# Patient Record
Sex: FEMALE | Race: WHITE | NOT HISPANIC OR LATINO | URBAN - METROPOLITAN AREA
[De-identification: names, ages, dates, MRNs, and addresses within clinical notes are randomized per-mention and may not be internally consistent; named-entity substitution may affect disease eponyms.]

---

## 2019-04-07 ENCOUNTER — INPATIENT (INPATIENT)
Facility: HOSPITAL | Age: 84
LOS: 3 days | Discharge: HOME | DRG: 61 | End: 2019-04-11
Attending: STUDENT IN AN ORGANIZED HEALTH CARE EDUCATION/TRAINING PROGRAM | Admitting: STUDENT IN AN ORGANIZED HEALTH CARE EDUCATION/TRAINING PROGRAM
Payer: MEDICARE

## 2019-04-07 VITALS
SYSTOLIC BLOOD PRESSURE: 155 MMHG | HEART RATE: 76 BPM | HEIGHT: 60 IN | DIASTOLIC BLOOD PRESSURE: 91 MMHG | RESPIRATION RATE: 18 BRPM | OXYGEN SATURATION: 95 % | WEIGHT: 160.06 LBS

## 2019-04-07 DIAGNOSIS — Z90.12 ACQUIRED ABSENCE OF LEFT BREAST AND NIPPLE: Chronic | ICD-10-CM

## 2019-04-07 DIAGNOSIS — I63.9 CEREBRAL INFARCTION, UNSPECIFIED: ICD-10-CM

## 2019-04-07 LAB
ALBUMIN SERPL ELPH-MCNC: 4.2 G/DL — SIGNIFICANT CHANGE UP (ref 3.3–5)
ALP SERPL-CCNC: 64 U/L — SIGNIFICANT CHANGE UP (ref 40–120)
ALT FLD-CCNC: 16 U/L — SIGNIFICANT CHANGE UP (ref 10–45)
ANION GAP SERPL CALC-SCNC: 10 MMOL/L — SIGNIFICANT CHANGE UP (ref 5–17)
APTT BLD: 30.7 SEC — SIGNIFICANT CHANGE UP (ref 27.5–36.3)
AST SERPL-CCNC: 19 U/L — SIGNIFICANT CHANGE UP (ref 10–40)
BASOPHILS # BLD AUTO: 0 K/UL — SIGNIFICANT CHANGE UP (ref 0–0.2)
BASOPHILS NFR BLD AUTO: 0.7 % — SIGNIFICANT CHANGE UP (ref 0–2)
BILIRUB SERPL-MCNC: 0.6 MG/DL — SIGNIFICANT CHANGE UP (ref 0.2–1.2)
BUN SERPL-MCNC: 9 MG/DL — SIGNIFICANT CHANGE UP (ref 7–23)
CALCIUM SERPL-MCNC: 9.8 MG/DL — SIGNIFICANT CHANGE UP (ref 8.4–10.5)
CHLORIDE SERPL-SCNC: 105 MMOL/L — SIGNIFICANT CHANGE UP (ref 96–108)
CO2 SERPL-SCNC: 26 MMOL/L — SIGNIFICANT CHANGE UP (ref 22–31)
CREAT SERPL-MCNC: 1.01 MG/DL — SIGNIFICANT CHANGE UP (ref 0.5–1.3)
EOSINOPHIL # BLD AUTO: 0.2 K/UL — SIGNIFICANT CHANGE UP (ref 0–0.5)
EOSINOPHIL NFR BLD AUTO: 3 % — SIGNIFICANT CHANGE UP (ref 0–6)
GLUCOSE SERPL-MCNC: 189 MG/DL — HIGH (ref 70–99)
HCT VFR BLD CALC: 47 % — HIGH (ref 34.5–45)
HGB BLD-MCNC: 15.1 G/DL — SIGNIFICANT CHANGE UP (ref 11.5–15.5)
INR BLD: 1.08 RATIO — SIGNIFICANT CHANGE UP (ref 0.88–1.16)
LYMPHOCYTES # BLD AUTO: 1.7 K/UL — SIGNIFICANT CHANGE UP (ref 1–3.3)
LYMPHOCYTES # BLD AUTO: 30.9 % — SIGNIFICANT CHANGE UP (ref 13–44)
MCHC RBC-ENTMCNC: 28.3 PG — SIGNIFICANT CHANGE UP (ref 27–34)
MCHC RBC-ENTMCNC: 32.1 GM/DL — SIGNIFICANT CHANGE UP (ref 32–36)
MCV RBC AUTO: 88.1 FL — SIGNIFICANT CHANGE UP (ref 80–100)
MONOCYTES # BLD AUTO: 0.3 K/UL — SIGNIFICANT CHANGE UP (ref 0–0.9)
MONOCYTES NFR BLD AUTO: 5.5 % — SIGNIFICANT CHANGE UP (ref 2–14)
NEUTROPHILS # BLD AUTO: 3.3 K/UL — SIGNIFICANT CHANGE UP (ref 1.8–7.4)
NEUTROPHILS NFR BLD AUTO: 59.9 % — SIGNIFICANT CHANGE UP (ref 43–77)
PLATELET # BLD AUTO: 155 K/UL — SIGNIFICANT CHANGE UP (ref 150–400)
POTASSIUM SERPL-MCNC: 3.8 MMOL/L — SIGNIFICANT CHANGE UP (ref 3.5–5.3)
POTASSIUM SERPL-SCNC: 3.8 MMOL/L — SIGNIFICANT CHANGE UP (ref 3.5–5.3)
PROT SERPL-MCNC: 6.5 G/DL — SIGNIFICANT CHANGE UP (ref 6–8.3)
PROTHROM AB SERPL-ACNC: 12.3 SEC — SIGNIFICANT CHANGE UP (ref 10–12.9)
RBC # BLD: 5.33 M/UL — HIGH (ref 3.8–5.2)
RBC # FLD: 13.1 % — SIGNIFICANT CHANGE UP (ref 10.3–14.5)
SODIUM SERPL-SCNC: 141 MMOL/L — SIGNIFICANT CHANGE UP (ref 135–145)
WBC # BLD: 5.6 K/UL — SIGNIFICANT CHANGE UP (ref 3.8–10.5)
WBC # FLD AUTO: 5.6 K/UL — SIGNIFICANT CHANGE UP (ref 3.8–10.5)

## 2019-04-07 PROCEDURE — 70498 CT ANGIOGRAPHY NECK: CPT | Mod: 26

## 2019-04-07 PROCEDURE — 99291 CRITICAL CARE FIRST HOUR: CPT

## 2019-04-07 PROCEDURE — 99285 EMERGENCY DEPT VISIT HI MDM: CPT | Mod: 25

## 2019-04-07 PROCEDURE — 93010 ELECTROCARDIOGRAM REPORT: CPT

## 2019-04-07 PROCEDURE — 70496 CT ANGIOGRAPHY HEAD: CPT | Mod: 26

## 2019-04-07 RX ORDER — ALTEPLASE 100 MG
5.9 KIT INTRAVENOUS ONCE
Qty: 0 | Refills: 0 | Status: COMPLETED | OUTPATIENT
Start: 2019-04-07 | End: 2019-04-07

## 2019-04-07 RX ORDER — ALTEPLASE 100 MG
53.1 KIT INTRAVENOUS ONCE
Qty: 0 | Refills: 0 | Status: COMPLETED | OUTPATIENT
Start: 2019-04-07 | End: 2019-04-07

## 2019-04-07 RX ORDER — ATORVASTATIN CALCIUM 80 MG/1
80 TABLET, FILM COATED ORAL AT BEDTIME
Qty: 0 | Refills: 0 | Status: DISCONTINUED | OUTPATIENT
Start: 2019-04-07 | End: 2019-04-11

## 2019-04-07 RX ORDER — LABETALOL HCL 100 MG
10 TABLET ORAL EVERY 4 HOURS
Qty: 0 | Refills: 0 | Status: DISCONTINUED | OUTPATIENT
Start: 2019-04-07 | End: 2019-04-11

## 2019-04-07 RX ORDER — ASPIRIN/CALCIUM CARB/MAGNESIUM 324 MG
300 TABLET ORAL DAILY
Qty: 0 | Refills: 0 | Status: DISCONTINUED | OUTPATIENT
Start: 2019-04-07 | End: 2019-04-07

## 2019-04-07 RX ORDER — PANTOPRAZOLE SODIUM 20 MG/1
40 TABLET, DELAYED RELEASE ORAL
Qty: 0 | Refills: 0 | Status: DISCONTINUED | OUTPATIENT
Start: 2019-04-07 | End: 2019-04-11

## 2019-04-07 RX ORDER — ACETAMINOPHEN 500 MG
1000 TABLET ORAL ONCE
Qty: 0 | Refills: 0 | Status: DISCONTINUED | OUTPATIENT
Start: 2019-04-07 | End: 2019-04-11

## 2019-04-07 RX ORDER — ESCITALOPRAM OXALATE 10 MG/1
5 TABLET, FILM COATED ORAL DAILY
Qty: 0 | Refills: 0 | Status: DISCONTINUED | OUTPATIENT
Start: 2019-04-07 | End: 2019-04-11

## 2019-04-07 RX ADMIN — ALTEPLASE 53.1 MILLIGRAM(S): KIT at 12:03

## 2019-04-07 RX ADMIN — ALTEPLASE 354 MILLIGRAM(S): KIT at 12:01

## 2019-04-07 RX ADMIN — Medication 10 MILLIGRAM(S): at 16:20

## 2019-04-07 NOTE — ED PROVIDER NOTE - OBJECTIVE STATEMENT
Patient is 88yF with PMH htn, distant breast ca s/p mastectomy, on asa presenting with RUE paresthesias and weakness, this morning at 9:30 AM was feeling in usual state of health and then when brushing her teeth had paresthesias and weakness in RUE, this improved, then on way to hospital had another episode. No pain or vision changes. No trauma.   Lithuanian speaking with daughter translating easily at bedside, declines phone translation    PMD: Abrudescu  ROS: Denies fever, palpitations, chills, recent sickness, HA, vision changes, cough, SOB, chest pain, abdominal pain, n/v/d/c, dysuria, hematuria, rash, new joint aches, sick contacts, and recent travel.

## 2019-04-07 NOTE — H&P ADULT - HISTORY OF PRESENT ILLNESS
89yo RH Portuguese woman with a PMHx of HTN, Breast CA. s/p L. mastectomy, presents with RUE/RLE paresthesia and weakness. Patient reports she woke normal. States that at 930am, he suddenly began experiencing unsteady gait, which she attributes to weakness of her RLE, as well as paresthesias of her RUE and RLE. Patient states that there was mild improvement of the symptoms without resolution. EMS was activated.   At [facility], Code Stroke was activated. CTH showed no indication of acute infarct. Labs grossly WNL. VSS. Patient remained symptomatic with hemiparesis and hemisensory loss.   Patient currently denies fevers, chills, ns, cp, sob, abd pain, n/v/d/c.     Benefits and risks of tPA were discussed with the patient, translated through HHA, as well as with daughter, fluent in english, over the phone.   All contraindications were reviewed with patient and patients daughter separately.   Both patient and patients daughter were agreeable to tPA administration.   tPA administered at 12h01.     NIHSS 2; MRS 1.

## 2019-04-07 NOTE — ED ADULT NURSE REASSESSMENT NOTE - NS ED NURSE REASSESS COMMENT FT1
Fiona LEARY Neuro resident said that we will not regulate the pt's BP and give any additional medications until the diastolic BP is above 220.   Maximiliano JOHNSON was present for the conversation and agreed with the treatment plan.   RNs will continue the neuro checks and vitals assessment as per stroke parameters.

## 2019-04-07 NOTE — H&P ADULT - ATTENDING COMMENTS
I have seen and examined this patient with the stroke neurology team.     History was reviewed with the patient and/or available family members.   ROS: All negative except documented in the HPI.   Neurological exam was performed and agree with exam as documented above.   Laboratory results and imaging studies were reviewed by me.   I agree with the neurology resident note as documented above.    88 years old woman with vascular risk factors of age and HTN is evaluated at [facility] for right-sided weakness. She is reported to be last known well at around 9:30 AM when she was brushing her teeth. Soon after that she noted right-sided weakness and sensory paresthesia, prompting her presentation to [facility]. Neurological examination showed mild right hemiparesis and difficulty with the gait. CT brain on admission did not show any evidence of acute infarct or hemorrhage. CTA head and neck to my eye showed severe to critical left supraclinoid ICA stenosis and moderate to severe proximal left ICA stenosis.    Impression:  Cerebral embolism with cerebral infarction. Probable left MCA distribution stroke involving subcortical structure like corona radiata - likely etiology being large vessel disease i.e. symptomatic intracranial atherosclerosis leading to artery to artery embolism versus focal cerebral hypoperfusion versus small vessel disease and less likely to be due to embolic stroke from a proximal source like cardiac/paradoxical source of embolism    Plan:   - Risks, benefits (considering potentially disabling deficits) and adverse reactions associated with IV tPA including hemorrhagic stroke were discussed with patient and available family member including daughter over the phone in detail. After ruling out all the contraindications and obtaining verbal consent, patient was treated with IV tPA  - Continue with  24 hours post IV tPA precautions including BP goal<185/110 mmHg before the tPA bolus administration and <180/105 mmHg after tPA bolus for first 24 hours followed by gradual normotension as per protocol  - Not a candidate for neurosurgical intervention based on CTA head and neck findings and low NIHSS on admission  - MRI brain without contrast   - Aspirin and pharmacological DVT prophylaxis to be considered at 24 hours after the IV tPA based on repeating brain imaging, SCDs for DVT prophylaxis in the interim  - Atorvastatin 80 mg at bedtime after establishing enteral access or passing swallow evaluation; further dose titration as per LDL results  - HbA1C and LDL, continue with aggressive vascular risk factors modifications   - TTE with bubble study and continue with telemetry to screen for possible cardiac source of embolism  - Prolonged cardiac monitoring with 30 days event monitor versus ICM to screen for occult cardiac arrhythmias like atrial fibrillation being the cardiac source of embolism to be considered based on results of above mentioned cardiac tests  - PT/OT/Speech and swallow evaluation   - Stroke education    Above mentioned plan was discussed with patient and available family member in detail. All the questions were answered and concerns were addressed. More than 82 minutes were spent in evaluation and management of this critically ill and unstable patient with an acute stroke.

## 2019-04-07 NOTE — ED PROVIDER NOTE - PHYSICAL EXAMINATION
Gen: NAD, AOx3  Head: NCAT  HEENT: PERRL, oral mucosa moist, normal conjunctiva  Lung: CTAB, no respiratory distress  CV: rrr, no murmurs, Normal perfusion  Abd: soft, NTND, no CVA tenderness  MSK: No edema, no visible deformities  Neuro: No focal neurologic deficits, CN intact, motor RUE 5/5 LUE 5/5 RLE 4+/5 LLE 5/5 and sensation intact, no dysmetria, no pronator drift   Skin: No rash   Psych: normal affect

## 2019-04-07 NOTE — ED PROVIDER NOTE - CLINICAL SUMMARY MEDICAL DECISION MAKING FREE TEXT BOX
Possible TIA vs stroke, code stroke called on arrival with neurology shortly at bedside. Also possible is peripheral neuropathy.

## 2019-04-07 NOTE — H&P ADULT - NSHPLABSRESULTS_GEN_ALL_CORE
Vitals:  T(C): 37 (04-07-19 @ 20:00), Max: 37 (04-07-19 @ 20:00)  HR: 65 (04-07-19 @ 22:00) (63 - 76)  BP: 157/75 (04-07-19 @ 22:00) (155/91 - 206/90)  RR: 19 (04-07-19 @ 22:00) (15 - 23)  SpO2: 95% (04-07-19 @ 22:00) (95% - 99%)    Labs:                        15.1   5.6   )-----------( 155      ( 07 Apr 2019 11:29 )             47.0     04-07    141  |  105  |  9   ----------------------------<  189<H>  3.8   |  26  |  1.01    Ca    9.8      07 Apr 2019 11:29    TPro  6.5  /  Alb  4.2  /  TBili  0.6  /  DBili  x   /  AST  19  /  ALT  16  /  AlkPhos  64  04-07    CAPILLARY BLOOD GLUCOSE  161 (07 Apr 2019 11:29)      POCT Blood Glucose.: 167 mg/dL (07 Apr 2019 11:19)    LIVER FUNCTIONS - ( 07 Apr 2019 11:29 )  Alb: 4.2 g/dL / Pro: 6.5 g/dL / ALK PHOS: 64 U/L / ALT: 16 U/L / AST: 19 U/L / GGT: x             PT/INR - ( 07 Apr 2019 11:29 )   PT: 12.3 sec;   INR: 1.08 ratio         PTT - ( 07 Apr 2019 11:29 )  PTT:30.7 sec    Radiology:  < from: CT Angio Neck w/ IV Cont (04.07.19 @ 12:10) >    IMPRESSION:    CT BRAIN:  No acute intracranial hemorrhage, mass effect, vasogenic edema, or evidence of acute territorial infarct.  Chronic left foraminal infarct and moderate white matter microvascular ischemic disease.  Ventricles moderately dilated slightly out of proportion to the sulci, suggesting the presence of normal pressure hydrocephalus    CTA BRAIN:  No flow-limiting stenosis.  Mild narrowing of the distal right supraclinoid left ICA. Mild narrowing of the P2 segment of the right PCA. Mild narrowing of the distal right M1 segment.  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   The neck measures approximately 1 mm in size.    CTA NECK:  55-60% short segment stenosis by NASCET criteria of the proximal left ICA beginning 1 cm from its origin due to calcified plaque.   Normal flow-related enhancement of the more distal left ICA.    SURJIT ARTIS M.D., ATTENDING RADIOLOGIST  This document has been electronically signed. Apr7 2019 12:17PM  < end of copied text >

## 2019-04-07 NOTE — CONSULT NOTE ADULT - SUBJECTIVE AND OBJECTIVE BOX
Patient is 88yF with PMH htn, distant breast ca s/p mastectomy, on asa presenting with RUE paresthesias and weakness, this morning at 9:30 AM was feeling in usual state of health and then when brushing her teeth had paresthesias and weakness in RUE, this improved, then on way to hospital had another episode. No pain or vision changes. No trauma.     PAST MEDICAL & SURGICAL HISTORY:  Dementia  Breast cancer  HTN (hypertension)  H/O mastectomy, left      Review of Systems:   CONSTITUTIONAL: No fever, weight loss, or fatigue  EYES: No eye pain, visual disturbances, or discharge  ENMT:  No difficulty hearing, tinnitus, vertigo; No sinus or throat pain  NECK: No pain or stiffness  BREASTS: No pain, masses, or nipple discharge  RESPIRATORY: No cough, wheezing, chills or hemoptysis; No shortness of breath  CARDIOVASCULAR: No chest pain, palpitations, dizziness, or leg swelling  GASTROINTESTINAL: No abdominal or epigastric pain. No nausea, vomiting, or hematemesis; No diarrhea or constipation. No melena or hematochezia.  GENITOURINARY: No dysuria, frequency, hematuria, or incontinence  NEUROLOGICAL: No headaches, memory loss, weakness and paresthesias R arm and R leg, numbness, or tremors  SKIN: No itching, burning, rashes, or lesions   LYMPH NODES: No enlarged glands  ENDOCRINE: No heat or cold intolerance; No hair loss  MUSCULOSKELETAL: No joint pain or swelling; No muscle, back, or extremity pain  PSYCHIATRIC: No depression, anxiety, mood swings, or difficulty sleeping  HEME/LYMPH: No easy bruising, or bleeding gums  ALLERY AND IMMUNOLOGIC: No hives or eczema    Allergies    penicillins (Unknown)    Intolerances        Social History:     FAMILY HISTORY:      MEDICATIONS  (STANDING):  atorvastatin 80 milliGRAM(s) Oral at bedtime    MEDICATIONS  (PRN):        CAPILLARY BLOOD GLUCOSE  161 (07 Apr 2019 11:29)      POCT Blood Glucose.: 167 mg/dL (07 Apr 2019 11:19)    I&O's Summary      PHYSICAL EXAM:  GENERAL: NAD, well-developed  HEAD:  Atraumatic, Normocephalic  EYES: EOMI, PERRLA, conjunctiva and sclera clear  NECK: Supple, No JVD  CHEST/LUNG: Clear to auscultation bilaterally; No wheeze  HEART: Regular rate and rhythm; No murmurs, rubs, or gallops  ABDOMEN: Soft, Nontender, Nondistended; Bowel sounds present  EXTREMITIES:  2+ Peripheral Pulses, No clubbing, cyanosis, or edema  PSYCH: AAOx3  NEUROLOGY: R sided weakness, L facial weakness  SKIN: No rashes or lesions    LABS:                        15.1   5.6   )-----------( 155      ( 07 Apr 2019 11:29 )             47.0     04-07    141  |  105  |  9   ----------------------------<  189<H>  3.8   |  26  |  1.01    Ca    9.8      07 Apr 2019 11:29    TPro  6.5  /  Alb  4.2  /  TBili  0.6  /  DBili  x   /  AST  19  /  ALT  16  /  AlkPhos  64  04-07    PT/INR - ( 07 Apr 2019 11:29 )   PT: 12.3 sec;   INR: 1.08 ratio         PTT - ( 07 Apr 2019 11:29 )  PTT:30.7 sec          RADIOLOGY & ADDITIONAL TESTS:    Imaging Personally Reviewed:  < from: CT Brain Stroke Protocol (04.07.19 @ 12:10) >  IMPRESSION:    CT BRAIN:    No acute intracranial hemorrhage, mass effect, vasogenic edema, or   evidence of acute territorial infarct.    Chronic left foraminal infarct and moderate white matter microvascular   ischemic disease.    Ventricles moderately dilated slightly out of proportion to the sulci,   suggesting the presence of normal pressure hydrocephalus    CTA BRAIN:    No flow-limiting stenosis.    Mild narrowing of the distal right supraclinoid left ICA. Mild narrowing   of the P2 segment of the right PCA. Mild narrowing of the distal right M1   segment.    1 to 2 mm posterolaterally projecting left proximal supraclinoid   aneurysm. The neck measures approximately 1 mm in size.    CTA NECK:    55-60% short segment stenosis by NASCET criteria of the proximal left ICA   beginning 1 cm from its origin due to calcified plaque. Normal   flow-related enhancement of the more distal left ICA.    < end of copied text >    Consultant(s) Notes Reviewed:      Care Discussed with Consultants/Other Providers:

## 2019-04-07 NOTE — ED ADULT NURSE REASSESSMENT NOTE - NS ED NURSE REASSESS COMMENT FT1
It has been nearly 2 and 1/2 hours since the pt started getting TPA and the pt is still in the ED.   VESTA Etienne made aware of the situation. Pt got a bed on the stroke unit and when Sanju HERNANDEZ called to give report on this pt the nurse said that she wouldn't be down to take report for another 25-30 minutes because the bed is not ready on the unit. Pt is now undergoing Q30 minute vitals in the ED.   VESTA Etienne made aware that DAVID Matos will not be down to the ED for another 25-30 minutes. VESTA is going to speak with the stroke unit.

## 2019-04-07 NOTE — ED ADULT NURSE REASSESSMENT NOTE - NS ED NURSE REASSESS COMMENT FT1
Report given to stroke unit RNGladis at pt's bedside. RN, Gladis monitoring the pt and will preform the next neuro check on the pt.

## 2019-04-07 NOTE — CONSULT NOTE ADULT - ASSESSMENT
88 f with  Possible CVA  - s/p TPA  - MRI brain  - Echo  - Neurology evaluation    HTN  - control    Dementia  - supportive care    d/w daughter at bedside  Mau Mc MD pager [phone]

## 2019-04-07 NOTE — ED ADULT NURSE NOTE - NSIMPLEMENTINTERV_GEN_ALL_ED
Implemented All Fall with Harm Risk Interventions:  [place] to call system. Call bell, personal items and telephone within reach. Instruct patient to call for assistance. Room bathroom lighting operational. Non-slip footwear when patient is off stretcher. Physically safe environment: no spills, clutter or unnecessary equipment. Stretcher in lowest position, wheels locked, appropriate side rails in place. Provide visual cue, wrist band, yellow gown, etc. Monitor gait and stability. Monitor for mental status changes and reorient to person, place, and time. Review medications for side effects contributing to fall risk. Reinforce activity limits and safety measures with patient and family. Provide visual clues: red socks.

## 2019-04-07 NOTE — H&P ADULT - NSHPPHYSICALEXAM_GEN_ALL_CORE
PHYSICAL EXAMINATION:  General: Well-developed, well nourished, in no acute distress.  Eyes: Conjunctiva and sclera clear.  Neurologic:  - Mental Status:  Alert, awake, oriented to person, place, and time; Speech is fluent with intact naming, repetition, and comprehension; Good overall fund of knowledge;   - Cranial Nerves II-XII:    II:  Visual fields are full to confrontation; Pupils are equal, round, and reactive to light;   III, IV, VI:  Extraocular movements are intact without nystagmus.  V:  Facial sensation is intact in the V1-V3 distribution bilaterally.  VII:  Face is symmetric with normal eye closure and smile  VIII:  Hearing is intact to finger rub.  IX, X:  Uvula is midline and soft palate rises symmetrically  XI:  Head turning and shoulder shrug are intact.  XII:  Tongue protudes in the midline.    + Motor:  Drift in RLE only. Strength is 5/5 elsewhere.     - Reflexes:  2+ and symmetric at the biceps, triceps, brachioradialis, knees, and ankles.  Plantar responses flexor.    + Sensory:  Diminished to pinprick in RUE and RLE.     - Coordination:  Finger-nose-finger and heel-knee-shin intact without dysmetria.  Rapid alternating hand movements intact.  - Gait:   Deferred     NIHSS 2.

## 2019-04-07 NOTE — ED ADULT NURSE REASSESSMENT NOTE - NS ED NURSE REASSESS COMMENT FT1
11:50 TPA being mixed, neuro attending wants the pt's TPA dose to be the 144lbs or 65.5kg.   11:55 It was noted that pt has a minor facial droop on the left side at rest. When smiling the droop is not visible. Neruo team indicated that her stroke score would increase to 2.   12:01 TPA bolus administered by Fiona LEARY.   12:03 RNSanju and Zane HERNANDEZ started the pt's TPA infusion.   12:05 Dysphagia completed, pt passed. Documented in the ED Stroke flowsheet.   Will continue to monitor pt on the neuro flow sheet as per our parameters with stroke.

## 2019-04-07 NOTE — H&P ADULT - ASSESSMENT
87yo RH Polish woman with a PMHx of HTN, Breast CA. s/p L. mastectomy, presents with RUE/RLE paresthesia and weakness.   NIHSS 2    Impression:  Mixed R. Hemiparesis and Hemisensory loss 2/2 suspected L. hemispheric dysfunction 2/2 ESUS vs. .    Plan:  Permissive HTN for 24 hours up to 185/105  Repeat CT head in 24 hours  If repeat CT head without hemorrhagic conversion, start on heparin/lovenox for DVT prophylaxis and ASA 81 mg QD  MRI brain without contrast  MRA head without contrast and neck with contrast  TTE with bubble study for possible valvular heart disease  Lipitor 80 mg PO QHS  HbA1c, LDL and continue with aggressive vascular risk factors modifications   Tele monitor  PT/OT/S&S eval

## 2019-04-07 NOTE — ED PROVIDER NOTE - ATTENDING CONTRIBUTION TO CARE
Private Physician Sarbjit  88y female pmh Hypertension,breast ca with left mastecomy, Pt complains of rue weakness and numbness when went to brush teeth. Lasted approx 15min. resolved. ?recurrence in ambulance also resolved. PE WDWN eldelry female awake alert normocephalic atraumatic neck supple chest clear anterior & posterior abd soft +bs no mass guarding, neuro gcs 15 speech fluent power 4/5 rle,5/5lle,5./5 felipe upper extr.  Fuad Hayden MD, Facep

## 2019-04-07 NOTE — ED ADULT NURSE NOTE - OBJECTIVE STATEMENT
89 y/o Female reports to ED from home complaining of right arm weakness. 87 y/o Female reports to ED from home via EMS. A&ox2 (person, place) complaining of right arm weakness. Pt primarily speaks [place] citizen, caregiver that is with pt is translating for her. Pt's neuro status is within her normal range as per caregiver. Pt reports that she woke up this morning feeling fine and then went into the bathroom to brush her teeth when she had sudden onset right arm weakness. Pt reports that the weakness lasted a few minutes and then resolved. Pt came to ED denying any weakness currently. Upon evaluation the pt was found to have a slight weakness to the right lower extremity. Pt reports pain in that extremity with examination. Pt denies changes in vision. Pt denies changes in her ability to ambulate. Pt denies LOC, H/A, SOB, C/P, N/V/D, abd pain. Pt denies fever or chills. Pt's family was called and notified. Pt scored a 1 on stroke scale upon her initial arrival and then again with a 1 at 12:35. Stroke code was called at 11:16 by Jackelyn JOHNSON. Pt and caregiver deny that pt takes blood thinners daily. Caregiver at bedside, pt on the cardiac monitor. Will continue to monitor pt.

## 2019-04-08 LAB
ANION GAP SERPL CALC-SCNC: 15 MMOL/L — SIGNIFICANT CHANGE UP (ref 5–17)
APPEARANCE UR: CLEAR — SIGNIFICANT CHANGE UP
BILIRUB UR-MCNC: NEGATIVE — SIGNIFICANT CHANGE UP
BUN SERPL-MCNC: 8 MG/DL — SIGNIFICANT CHANGE UP (ref 7–23)
CALCIUM SERPL-MCNC: 9.1 MG/DL — SIGNIFICANT CHANGE UP (ref 8.4–10.5)
CHLORIDE SERPL-SCNC: 104 MMOL/L — SIGNIFICANT CHANGE UP (ref 96–108)
CHOLEST SERPL-MCNC: 201 MG/DL — HIGH (ref 10–199)
CO2 SERPL-SCNC: 23 MMOL/L — SIGNIFICANT CHANGE UP (ref 22–31)
COLOR SPEC: SIGNIFICANT CHANGE UP
CREAT SERPL-MCNC: 0.84 MG/DL — SIGNIFICANT CHANGE UP (ref 0.5–1.3)
DIFF PNL FLD: NEGATIVE — SIGNIFICANT CHANGE UP
ESTIMATED AVERAGE GLUCOSE: 123 MG/DL — HIGH (ref 68–114)
GLUCOSE SERPL-MCNC: 115 MG/DL — HIGH (ref 70–99)
GLUCOSE UR QL: NEGATIVE — SIGNIFICANT CHANGE UP
HBA1C BLD-MCNC: 5.9 % — HIGH (ref 4–5.6)
HBA1C BLD-MCNC: 5.9 % — HIGH (ref 4–5.6)
HCT VFR BLD CALC: 41.5 % — SIGNIFICANT CHANGE UP (ref 34.5–45)
HDLC SERPL-MCNC: 38 MG/DL — LOW
HGB BLD-MCNC: 13.8 G/DL — SIGNIFICANT CHANGE UP (ref 11.5–15.5)
KETONES UR-MCNC: NEGATIVE — SIGNIFICANT CHANGE UP
LEUKOCYTE ESTERASE UR-ACNC: NEGATIVE — SIGNIFICANT CHANGE UP
LIPID PNL WITH DIRECT LDL SERPL: 136 MG/DL — HIGH
MCHC RBC-ENTMCNC: 29 PG — SIGNIFICANT CHANGE UP (ref 27–34)
MCHC RBC-ENTMCNC: 33.2 GM/DL — SIGNIFICANT CHANGE UP (ref 32–36)
MCV RBC AUTO: 87.3 FL — SIGNIFICANT CHANGE UP (ref 80–100)
NITRITE UR-MCNC: NEGATIVE — SIGNIFICANT CHANGE UP
PH UR: 7 — SIGNIFICANT CHANGE UP (ref 5–8)
PLATELET # BLD AUTO: 134 K/UL — LOW (ref 150–400)
POTASSIUM SERPL-MCNC: 3.4 MMOL/L — LOW (ref 3.5–5.3)
POTASSIUM SERPL-SCNC: 3.4 MMOL/L — LOW (ref 3.5–5.3)
PROT UR-MCNC: NEGATIVE — SIGNIFICANT CHANGE UP
RBC # BLD: 4.75 M/UL — SIGNIFICANT CHANGE UP (ref 3.8–5.2)
RBC # FLD: 13 % — SIGNIFICANT CHANGE UP (ref 10.3–14.5)
SODIUM SERPL-SCNC: 142 MMOL/L — SIGNIFICANT CHANGE UP (ref 135–145)
SP GR SPEC: 1.01 — SIGNIFICANT CHANGE UP (ref 1.01–1.02)
TOTAL CHOLESTEROL/HDL RATIO MEASUREMENT: 5.3 RATIO — SIGNIFICANT CHANGE UP (ref 3.3–7.1)
TRIGL SERPL-MCNC: 137 MG/DL — SIGNIFICANT CHANGE UP (ref 10–149)
UROBILINOGEN FLD QL: NEGATIVE — SIGNIFICANT CHANGE UP
WBC # BLD: 5.8 K/UL — SIGNIFICANT CHANGE UP (ref 3.8–10.5)
WBC # FLD AUTO: 5.8 K/UL — SIGNIFICANT CHANGE UP (ref 3.8–10.5)

## 2019-04-08 PROCEDURE — 93970 EXTREMITY STUDY: CPT | Mod: 26

## 2019-04-08 PROCEDURE — 70551 MRI BRAIN STEM W/O DYE: CPT | Mod: 26

## 2019-04-08 RX ORDER — ENOXAPARIN SODIUM 100 MG/ML
40 INJECTION SUBCUTANEOUS DAILY
Qty: 0 | Refills: 0 | Status: DISCONTINUED | OUTPATIENT
Start: 2019-04-08 | End: 2019-04-11

## 2019-04-08 RX ORDER — ASPIRIN/CALCIUM CARB/MAGNESIUM 324 MG
81 TABLET ORAL DAILY
Qty: 0 | Refills: 0 | Status: DISCONTINUED | OUTPATIENT
Start: 2019-04-08 | End: 2019-04-11

## 2019-04-08 RX ORDER — SODIUM CHLORIDE 9 MG/ML
250 INJECTION INTRAMUSCULAR; INTRAVENOUS; SUBCUTANEOUS ONCE
Qty: 0 | Refills: 0 | Status: COMPLETED | OUTPATIENT
Start: 2019-04-08 | End: 2019-04-08

## 2019-04-08 RX ORDER — POTASSIUM CHLORIDE 20 MEQ
40 PACKET (EA) ORAL ONCE
Qty: 0 | Refills: 0 | Status: COMPLETED | OUTPATIENT
Start: 2019-04-08 | End: 2019-04-08

## 2019-04-08 RX ADMIN — Medication 40 MILLIEQUIVALENT(S): at 12:32

## 2019-04-08 RX ADMIN — SODIUM CHLORIDE 250 MILLILITER(S): 9 INJECTION INTRAMUSCULAR; INTRAVENOUS; SUBCUTANEOUS at 03:32

## 2019-04-08 RX ADMIN — ESCITALOPRAM OXALATE 5 MILLIGRAM(S): 10 TABLET, FILM COATED ORAL at 12:32

## 2019-04-08 RX ADMIN — ENOXAPARIN SODIUM 40 MILLIGRAM(S): 100 INJECTION SUBCUTANEOUS at 18:49

## 2019-04-08 RX ADMIN — PANTOPRAZOLE SODIUM 40 MILLIGRAM(S): 20 TABLET, DELAYED RELEASE ORAL at 09:31

## 2019-04-08 RX ADMIN — ATORVASTATIN CALCIUM 80 MILLIGRAM(S): 80 TABLET, FILM COATED ORAL at 23:55

## 2019-04-08 RX ADMIN — Medication 81 MILLIGRAM(S): at 18:50

## 2019-04-08 NOTE — OCCUPATIONAL THERAPY INITIAL EVALUATION ADULT - ADL RETRAINING, OT EVAL
Patient will dress lower body with Min A, AE as needed within 2 weeks. Patient will dress lower body independently, AE as needed within 2 weeks. Patient will dress upper body independently, AE as needed within 2 weeks.

## 2019-04-08 NOTE — OCCUPATIONAL THERAPY INITIAL EVALUATION ADULT - LIVES WITH, PROFILE
alone/As per conversation with pt's daughter, pt lives in private home, 0 steps to enter, 0 steps inside, tub with curtain and grab bars, [facility] 24/7 to assist with ADLs, pt was ambulating with rolling walker.

## 2019-04-08 NOTE — PROGRESS NOTE ADULT - SUBJECTIVE AND OBJECTIVE BOX
Patient is a 88y old  Female who presents with a chief complaint of R sided weakness (2019 16:00)      SUBJECTIVE / OVERNIGHT EVENTS: awake, comfortable. Wants to go home.  Review of Systems  chest pain no  palpitations no  sob no  nausea no  headache no    MEDICATIONS  (STANDING):  aspirin  chewable 81 milliGRAM(s) Oral daily  atorvastatin 80 milliGRAM(s) Oral at bedtime  enoxaparin Injectable 40 milliGRAM(s) SubCutaneous daily  escitalopram 5 milliGRAM(s) Oral daily  pantoprazole    Tablet 40 milliGRAM(s) Oral before breakfast    MEDICATIONS  (PRN):  acetaminophen  IVPB .. 1000 milliGRAM(s) IV Intermittent once PRN Mild Pain (1 - 3), Moderate Pain (4 - 6)  labetalol Injectable 10 milliGRAM(s) IV Push every 4 hours PRN SBP >180      Vital Signs Last 24 Hrs  T(C): 37 (2019 08:14), Max: 37 (2019 20:00)  T(F): 98.6 (2019 08:14), Max: 98.6 (2019 20:00)  HR: 69 (2019 18:00) (58 - 79)  BP: 165/87 (2019 18:00) (124/53 - 182/87)  BP(mean): 108 (2019 18:00) (74 - 113)  RR: 18 (2019 18:00) (11 - 24)  SpO2: 96% (2019 18:00) (94% - 99%)    PHYSICAL EXAM:  GENERAL: NAD, well-developed  HEAD:  Atraumatic, Normocephalic  EYES: EOMI, PERRLA, conjunctiva and sclera clear  NECK: Supple, No JVD  CHEST/LUNG: Clear to auscultation bilaterally; No wheeze  HEART: Regular rate and rhythm; No murmurs, rubs, or gallops  ABDOMEN: Soft, Nontender, Nondistended; Bowel sounds present  EXTREMITIES:  2+ Peripheral Pulses, No clubbing, cyanosis, or edema  PSYCH: pleasantly confused.  NEUROLOGY: non-focal  SKIN: No rashes or lesions    LABS:                        13.8   5.8   )-----------( 134      ( 2019 05:42 )             41.5     04-08    142  |  104  |  8   ----------------------------<  115<H>  3.4<L>   |  23  |  0.84    Ca    9.1      2019 05:42    TPro  6.5  /  Alb  4.2  /  TBili  0.6  /  DBili  x   /  AST  19  /  ALT  16  /  AlkPhos  64  04-07    PT/INR - ( 2019 11:29 )   PT: 12.3 sec;   INR: 1.08 ratio         PTT - ( 2019 11:29 )  PTT:30.7 sec      Urinalysis Basic - ( 2019 12:55 )    Color: Light Yellow / Appearance: Clear / S.011 / pH: x  Gluc: x / Ketone: Negative  / Bili: Negative / Urobili: Negative   Blood: x / Protein: Negative / Nitrite: Negative   Leuk Esterase: Negative / RBC: x / WBC x   Sq Epi: x / Non Sq Epi: x / Bacteria: x          RADIOLOGY & ADDITIONAL TESTS:    Imaging Personally Reviewed:  < from: MR Head No Cont (19 @ 15:45) >    IMPRESSION: Large ventricles consistent with moderate atrophy. Small   vessel white matter ischemic changes. A few tiny foci of diffusion   restriction in the left posterior frontal white matter consistent with   acute infarcts.      < end of copied text >    Consultant(s) Notes Reviewed:      Care Discussed with Consultants/Other Providers:

## 2019-04-08 NOTE — PROVIDER CONTACT NOTE (OTHER) - ASSESSMENT
patient was sleeping and was awaken for neurologically assessment. No change in mental status. Patient was resting comfortably. BP: 133/88, HR 58, SPO2: 95 on room air.

## 2019-04-08 NOTE — OCCUPATIONAL THERAPY INITIAL EVALUATION ADULT - DIAGNOSIS, OT EVAL
Pt p/w deficits in ADL status and functional mobility due to impaired cognition, balance, and coordination.

## 2019-04-08 NOTE — PROGRESS NOTE ADULT - ASSESSMENT
88 f with  CVA  - s/p TPA  - Echo    HTN  - control    Dementia  - supportive care  Mau Mc MD pager [phone]

## 2019-04-08 NOTE — OCCUPATIONAL THERAPY INITIAL EVALUATION ADULT - PLANNED THERAPY INTERVENTIONS, OT EVAL
ADL retraining/balance training/bed mobility training/transfer training/cognitive, visual perceptual

## 2019-04-08 NOTE — PROVIDER CONTACT NOTE (OTHER) - ASSESSMENT
patient is neurologically stable, BP: 124/53, HR 59, SPO2: 95 on room air. Patient was sleeping prior.

## 2019-04-08 NOTE — OCCUPATIONAL THERAPY INITIAL EVALUATION ADULT - PERTINENT HX OF CURRENT PROBLEM, REHAB EVAL
87yo RH Thai woman with a PMHx of HTN, Breast CA. s/p L. mastectomy, presents with RUE/RLE paresthesia and weakness. He suddenly began experiencing unsteady gait, which she attributes to weakness and paresthesias of her RUE and RLE. At [facility], Code Stroke was activated. CTH showed no indication of acute infarct. Labs grossly WNL. VSS. Patient remained symptomatic with hemiparesis and hemisensory loss. tPA administered at 12h01.

## 2019-04-08 NOTE — PHYSICAL THERAPY INITIAL EVALUATION ADULT - DISCHARGE DISPOSITION, PT EVAL
home w/ home PT/home w/ assist/home/Home with Home PT for strengthening, bed mob, transfer, gait and balance training, home with assist for all functional mobility

## 2019-04-08 NOTE — PHYSICAL THERAPY INITIAL EVALUATION ADULT - ADDITIONAL COMMENTS
as per pt and daughter via phone, pt resides in a  with 24/7 aide, no stair to enter, PTA, pt able to amb (I) with RW indoor, distance limited d/t decr endurance, required assist for all functional mobility

## 2019-04-08 NOTE — PHYSICAL THERAPY INITIAL EVALUATION ADULT - PERTINENT HX OF CURRENT PROBLEM, REHAB EVAL
88yoF with HTN, breast Ca s/p L mastectomy, p/w R paresthesia, now s/p tPA, CT head 4/7, chronic L foraminal infarct, NPH< mild narrowing of L ICA, R PCA, ECG 4/7, incomplete R BBB, Left ventricular hypertrophy

## 2019-04-08 NOTE — OCCUPATIONAL THERAPY INITIAL EVALUATION ADULT - ADDITIONAL COMMENTS
CT BRAIN 4/7- No acute intracranial hemorrhage, mass effect, vasogenic edema, or evidence of acute territorial infarct. Chronic left foraminal infarct and moderate white matter microvascular ischemic disease. Ventricles moderately dilated slightly out of proportion to the sulci, suggesting the presence of normal pressure hydrocephalus    CTA BRAIN 4/7- No flow-limiting stenosis. Mild narrowing of the distal right supraclinoid left ICA. Mild narrowing of the P2 segment of the right PCA. Mild narrowing of the distal right M1 segment. 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 The neck measures approximately 1 mm in size.    CTA NECK 4/7- 55-60% short segment stenosis by NASCET criteria of the proximal left ICA beginning 1 cm from its origin due to calcified plaque. Normal flow-related enhancement of the more distal left ICA.

## 2019-04-08 NOTE — OCCUPATIONAL THERAPY INITIAL EVALUATION ADULT - ORIENTATION, REHAB EVAL
Unable to provide date of birth, required vc's for place, time, and situation./place/time/situation/person

## 2019-04-08 NOTE — PROGRESS NOTE ADULT - ASSESSMENT
ASSESSMENT: 88 year old woman with vascular risk factors of age and HTN is evaluated at [facility] for right-sided weakness. She was reported to be last known well at around 9:30 AM 4/7/19 when she was brushing her teeth. Soon after that she noted right-sided weakness and sensory paresthesia, prompting her presentation to [facility]. CT brain on admission did not show any evidence of acute infarct or hemorrhage. CTA head and neck to my eye showed severe to critical left supraclinoid ICA stenosis and moderate to severe proximal left ICA stenosis.    Impression:  Cerebral embolism with cerebral infarction. Probable left MCA distribution stroke involving subcortical structure like corona radiata - likely etiology being large vessel disease i.e. symptomatic intracranial atherosclerosis leading to artery to artery embolism versus focal cerebral hypoperfusion versus small vessel disease and less likely to be due to embolic stroke from a proximal source like cardiac/paradoxical source of embolism.     NEURO: neurologically without acute change, continue close monitoring for neurologic deterioration, permissive HTN, titrate statin to LDL goal less than 70, MR imaging pending. Physical therapy/OT/Speech eval.    ANTITHROMBOTIC THERAPY: ASA pending stable repeat 24 hour imaging     PULMONARY: protecting airway, saturating well     CARDIOVASCULAR: check TTE, cardiac monitoring, Prolonged cardiac monitoring with 30 days event monitor versus ICM to screen for occult cardiac arrhythmias like atrial fibrillation being the cardiac source of embolism to be considered based on results of above mentioned cardiac tests          SBP goal:  <180/105 mmHg after tPA bolus for first 24 hours followed by gradual normotension as per protocol    GASTROINTESTINAL:  dysphagia screen passed, tolerating diet, advance to regular      Diet: puree     RENAL: BUN/Cr within range, ensure adequate hydration, good urine output      Na Goal: Greater than 135     Serna: n     HEMATOLOGY: H/H without anemia, Platelets 134, monitor for further thrombocytopenia     DVT ppx: venodynes for now, pending repeat 24 hour imaging consider pharmacological ppx     ID: afebrile, no leukocytosis, + frequency for urination, check UA    DISPOSITION: Rehab or home depending on PT eval once stable and workup is complete      CORE MEASURES:        Admission NIHSS:2      TPA: [x] YES [] NO      LDL/HDL: 136/38     Depression Screen:  NA- unable to participate      Statin Therapy: y      Dysphagia Screen: [x] PASS [] FAIL     Smoking [] YES [x] NO      Afib [] YES [x] NO     Stroke Education [x] YES [] NO    Obtain screening lower extremity venous ultrasound in patients who meet 1 or more of the following criteria as patient is high risk for DVT/PE on admission:   [] History of DVT/PE  []Hypercoagulable states (Factor V Leiden, Cancer, OCP, etc. ) -history of breast ca   []Prolonged immobility (hemiplegia/hemiparesis/post operative or any other extended immobilization)  [] Transferred from outside facility (Rehab or Long term care)  [] Age </= to 50

## 2019-04-08 NOTE — OCCUPATIONAL THERAPY INITIAL EVALUATION ADULT - BALANCE TRAINING, PT EVAL
Pt will increase dynamic standing balance 1/2 grade to increase safety/independence with functional transfers and ADLs within 2 weeks.

## 2019-04-08 NOTE — PROVIDER CONTACT NOTE (OTHER) - ACTION/TREATMENT ORDERED:
Bolus was given as per provider and patient's BP at 4:00 was 140/88. Will continue to monitor the patient.

## 2019-04-08 NOTE — PROGRESS NOTE ADULT - SUBJECTIVE AND OBJECTIVE BOX
THE PATIENT WAS SEEN AND EXAMINED BY ME WITH THE HOUSESTAFF AND STROKE TEAM DURING MORNING ROUNDS.   HPI:  87yo RH [place] woman with a PMHx of HTN, Breast CA. s/p L. mastectomy, presented with RUE/RLE paresthesia and weakness. Patient reported she woke normal. Stated that at 930am day of admission, she suddenly began experiencing unsteady gait, which she attributes to weakness of her RLE, as well as paresthesias of her RUE and RLE. Patient stated that there was mild improvement of the symptoms without resolution. EMS was activated.  At [facility], Code Stroke was activated. CTH showed no indication of acute infarct. Labs grossly WNL. VSS. Patient remained symptomatic with hemiparesis and hemisensory loss.  tPA administered at 12h01. NIHSS 2; MRS 1.      SUBJECTIVE: No events overnight.  No new neurologic complaints.  Wants to go home.   [id number] (Roberta)    acetaminophen  IVPB .. 1000 milliGRAM(s) IV Intermittent once PRN  atorvastatin 80 milliGRAM(s) Oral at bedtime  escitalopram 5 milliGRAM(s) Oral daily  labetalol Injectable 10 milliGRAM(s) IV Push every 4 hours PRN  pantoprazole    Tablet 40 milliGRAM(s) Oral before breakfast  potassium chloride    Tablet ER 40 milliEquivalent(s) Oral once      PHYSICAL EXAM:   Vital Signs Last 24 Hrs  T(C): 37 (08 Apr 2019 08:14), Max: 37 (07 Apr 2019 20:00)  T(F): 98.6 (08 Apr 2019 08:14), Max: 98.6 (07 Apr 2019 20:00)  HR: 77 (08 Apr 2019 10:00) (58 - 77)  BP: 136/92 (08 Apr 2019 10:00) (124/53 - 206/90)  BP(mean): 106 (08 Apr 2019 10:00) (74 - 166)  RR: 24 (08 Apr 2019 10:00) (11 - 24)  SpO2: 94% (08 Apr 2019 10:00) (94% - 99%)    General: No acute distress  HEENT: EOM intact, visual fields full  Abdomen: Soft, nontender, nondistended   Extremities: No edema    NEUROLOGICAL EXAM:  Mental status: Awake, alert, oriented x self, states 2009 then 2012, [facility] for location, follows simple commands, reptation intact, perseverates   Cranial Nerves: trace right facial droopy, no nystagmus, subtle dysarthria,  tongue midline  Motor exam: slight right hemiparesis: RUE 4/5 distally, proximally 5/5, RLE 4/5, 5/5 LUE, 5/5 LLE   Sensation: Intact to light touch   Coordination/ Gait: gait not assessed     LABS:                        13.8   5.8   )-----------( 134      ( 08 Apr 2019 05:42 )             41.5    04-08    142  |  104  |  8   ----------------------------<  115<H>  3.4<L>   |  23  |  0.84    Ca    9.1      08 Apr 2019 05:42    TPro  6.5  /  Alb  4.2  /  TBili  0.6  /  DBili  x   /  AST  19  /  ALT  16  /  AlkPhos  64  04-07  PT/INR - ( 07 Apr 2019 11:29 )   PT: 12.3 sec;   INR: 1.08 ratio         PTT - ( 07 Apr 2019 11:29 )  PTT:30.7 sec  Hemoglobin A1C, Whole Blood: 5.9 % (04-08 @ 08:49)  Hemoglobin A1C, Whole Blood: 5.9 % (04-08 @ 08:49)      IMAGING: Reviewed by me.     04.07.19   CT BRAIN:  No acute intracranial hemorrhage, mass effect, vasogenic edema, or   evidence of acute territorial infarct.  Chronic left foraminal infarct and moderate white matter microvascular   ischemic disease.  Ventricles moderately dilated slightly out of proportion to the sulci,   suggesting the presence of normal pressure hydrocephalus    CTA BRAIN:  No flow-limiting stenosis.  Mild narrowing of the distal right supraclinoid left ICA. Mild narrowing   of the P2 segment of the right PCA. Mild narrowing of the distal right M1   segment.  1 to 2 mm posterolaterally projecting left proximal supraclinoid   aneurysm. The neck measures approximately 1 mm in size.    CTA NECK:  55-60% short segment stenosis by NASCET criteria of the proximal left ICA   beginning 1 cm from its origin due to calcified plaque. Normal   flow-related enhancement of the more distal left ICA.

## 2019-04-08 NOTE — OCCUPATIONAL THERAPY INITIAL EVALUATION ADULT - ANTICIPATED DISCHARGE DISPOSITION, OT EVAL
home w/ level of assist home w/ level of assist/Home with assist for ADLs and functional transfers, no skilled OT needs. Pt has 24/7 HHA.

## 2019-04-09 ENCOUNTER — TRANSCRIPTION ENCOUNTER (OUTPATIENT)
Age: 84
End: 2019-04-09

## 2019-04-09 PROCEDURE — 93306 TTE W/DOPPLER COMPLETE: CPT | Mod: 26

## 2019-04-09 PROCEDURE — 93880 EXTRACRANIAL BILAT STUDY: CPT | Mod: 26

## 2019-04-09 PROCEDURE — 99223 1ST HOSP IP/OBS HIGH 75: CPT

## 2019-04-09 RX ORDER — ATORVASTATIN CALCIUM 80 MG/1
1 TABLET, FILM COATED ORAL
Qty: 30 | Refills: 0 | OUTPATIENT
Start: 2019-04-09 | End: 2019-05-08

## 2019-04-09 RX ORDER — ASPIRIN/CALCIUM CARB/MAGNESIUM 324 MG
1 TABLET ORAL
Qty: 30 | Refills: 0 | OUTPATIENT
Start: 2019-04-09 | End: 2019-05-08

## 2019-04-09 RX ORDER — ESCITALOPRAM OXALATE 10 MG/1
1 TABLET, FILM COATED ORAL
Qty: 0 | Refills: 0 | COMMUNITY
Start: 2019-04-09

## 2019-04-09 RX ORDER — ESCITALOPRAM OXALATE 10 MG/1
1 TABLET, FILM COATED ORAL
Qty: 0 | Refills: 0 | COMMUNITY

## 2019-04-09 RX ADMIN — ENOXAPARIN SODIUM 40 MILLIGRAM(S): 100 INJECTION SUBCUTANEOUS at 11:31

## 2019-04-09 RX ADMIN — ESCITALOPRAM OXALATE 5 MILLIGRAM(S): 10 TABLET, FILM COATED ORAL at 11:31

## 2019-04-09 RX ADMIN — Medication 81 MILLIGRAM(S): at 11:31

## 2019-04-09 RX ADMIN — PANTOPRAZOLE SODIUM 40 MILLIGRAM(S): 20 TABLET, DELAYED RELEASE ORAL at 08:04

## 2019-04-09 RX ADMIN — ATORVASTATIN CALCIUM 80 MILLIGRAM(S): 80 TABLET, FILM COATED ORAL at 22:00

## 2019-04-09 NOTE — DISCHARGE NOTE PROVIDER - CARE PROVIDER_API CALL
Moreno Hernandez (DO)  Neurology; Vascular Neurology  [address] [facility] [address]  [place] [address]  Phone: [phone]  Fax: [phone]  Follow Up Time: Moreno Hernandez (DO)  Neurology; Vascular Neurology  [address] [facility] [address]  [place] [address]  Phone: [phone]  Fax: [phone]  Follow Up Time:     Maykel Mora (MD)  Surgery  Vascular  [address] [place] [address], [address] [place] [id number]  Phone: [phone]  Fax: [phone]  Follow Up Time:

## 2019-04-09 NOTE — PROGRESS NOTE ADULT - SUBJECTIVE AND OBJECTIVE BOX
Patient is a 88y old  Female who presents with a chief complaint of R sided weakness (2019 16:00)      SUBJECTIVE / OVERNIGHT EVENTS: Comfortable without new complaints.   Review of Systems  chest pain no  palpitations no  sob no  nausea no  headache no    MEDICATIONS  (STANDING):  aspirin  chewable 81 milliGRAM(s) Oral daily  atorvastatin 80 milliGRAM(s) Oral at bedtime  enoxaparin Injectable 40 milliGRAM(s) SubCutaneous daily  escitalopram 5 milliGRAM(s) Oral daily  pantoprazole    Tablet 40 milliGRAM(s) Oral before breakfast    MEDICATIONS  (PRN):  acetaminophen  IVPB .. 1000 milliGRAM(s) IV Intermittent once PRN Mild Pain (1 - 3), Moderate Pain (4 - 6)  labetalol Injectable 10 milliGRAM(s) IV Push every 4 hours PRN SBP >180      Vital Signs Last 24 Hrs  T(C): 36.5 (2019 09:02), Max: 36.9 (2019 20:00)  T(F): 97.7 (2019 09:02), Max: 98.5 (2019 20:00)  HR: 67 (2019 09:02) (63 - 79)  BP: 154/85 (2019 09:02) (140/85 - 165/87)  BP(mean): 89 (2019 20:00) (89 - 108)  RR: 18 (2019 09:02) (18 - 22)  SpO2: 95% (2019 09:02) (95% - 98%)    PHYSICAL EXAM:  GENERAL: NAD  HEAD:  Atraumatic, Normocephalic  EYES: EOMI, PERRLA, conjunctiva and sclera clear  NECK: Supple, No JVD  CHEST/LUNG: Clear to auscultation bilaterally; No wheeze  HEART: Regular rate and rhythm; No murmurs, rubs, or gallops  ABDOMEN: Soft, Nontender, Nondistended; Bowel sounds present  EXTREMITIES:  2+ Peripheral Pulses, No clubbing, cyanosis, or edema  PSYCH: AAOx3, pleasant confused  NEUROLOGY: R sided weakness  SKIN: No rashes or lesions    LABS:                        13.8   5.8   )-----------( 134      ( 2019 05:42 )             41.5     04-08    142  |  104  |  8   ----------------------------<  115<H>  3.4<L>   |  23  |  0.84    Ca    9.1      2019 05:42            Urinalysis Basic - ( 2019 12:55 )    Color: Light Yellow / Appearance: Clear / S.011 / pH: x  Gluc: x / Ketone: Negative  / Bili: Negative / Urobili: Negative   Blood: x / Protein: Negative / Nitrite: Negative   Leuk Esterase: Negative / RBC: x / WBC x   Sq Epi: x / Non Sq Epi: x / Bacteria: x          RADIOLOGY & ADDITIONAL TESTS:    Imaging Personally Reviewed:  < from: CT Angio Neck w/ IV Cont (19 @ 12:10) >    CTA NECK:    55-60% short segment stenosis by NASCET criteria of the proximal left ICA   beginning 1 cm from its origin due to calcified plaque. Normal   flow-related enhancement of the more distal left ICA.    < end of copied text >    Consultant(s) Notes Reviewed:      Care Discussed with Consultants/Other Providers:

## 2019-04-09 NOTE — DISCHARGE NOTE NURSING/CASE MANAGEMENT/SOCIAL WORK - NSDCDPATPORTLINK_GEN_ALL_CORE
You can access the [facility] Patient Portal, offered by [facility], by registering with the following website: [email or web address]

## 2019-04-09 NOTE — DISCHARGE NOTE PROVIDER - PROVIDER TOKENS
PROVIDER:[TOKEN:[[id number]]] PROVIDER:[TOKEN:[[id number]]],PROVIDER:[TOKEN:[[id number]]]

## 2019-04-09 NOTE — PROGRESS NOTE ADULT - ASSESSMENT
88 f with  CVA  - s/p TPA  - Echo pending     HTN  - control    Carotid stenosis  - vascular follow re CEA vs stent.    Dementia  - supportive care  Mau Mc MD pager [phone]

## 2019-04-09 NOTE — DISCHARGE NOTE PROVIDER - HOSPITAL COURSE
87yo RH Danish woman with a PMHx of HTN, Breast CA. s/p L. mastectomy, presented with RUE/RLE paresthesia and weakness. Patient reported she woke normal. Stated that at 930am day of admission, she suddenly began experiencing unsteady gait, which she attributes to weakness of her RLE, as well as paresthesias of her RUE and RLE. Patient stated that there was mild improvement of the symptoms without resolution. EMS was activated.  At [facility], Code Stroke was activated. CTH showed no indication of acute infarct. Labs grossly WNL. VSS. Patient remained symptomatic with hemiparesis and hemisensory loss.  tPA administered at 12h01. NIHSS 2; MRS 1.     	    04.07.19     CT BRAIN:    No acute intracranial hemorrhage, mass effect, vasogenic edema, or     evidence of acute territorial infarct.    Chronic left foraminal infarct and moderate white matter microvascular     ischemic disease.    Ventricles moderately dilated slightly out of proportion to the sulci,     suggesting the presence of normal pressure hydrocephalus        CTA BRAIN:    No flow-limiting stenosis.    Mild narrowing of the distal right supraclinoid left ICA. Mild narrowing     of the P2 segment of the right PCA. Mild narrowing of the distal right M1     segment.    1 to 2 mm posterolaterally projecting left proximal supraclinoid     aneurysm. The neck measures approximately 1 mm in size.        CTA NECK:    55-60% short segment stenosis by NASCET criteria of the proximal left ICA     beginning 1 cm from its origin due to calcified plaque. Normal     flow-related enhancement of the more distal left ICA.        04.08.19    MRI Brain:    IMPRESSION: Large ventricles consistent with moderate atrophy. Small     vessel white matter ischemic changes. A few tiny foci of diffusion     restriction in the left posterior frontal white matter consistent with     acute infarcts.            < from: [facility] Duplex Lower Ext Vein Scan, Bilat (04.08.19 @ 22:42) >    Impression:         No deep vein thrombosis in either lower extremity.        BUZZ INTERIANO M.D., ATTENDING RADIOLOGIST    This document has been electronically signed. Apr 8 2019 11:39PM    < end of copied text >        PT recommending Home w/ home PT.     Dysphagia screen passed, tolerating diet, advance to regular; Diet: puree     TTE outpatient.            Admission NIHSS:2        TPA: [x] YES [] NO        LDL/HDL: 136/38       Depression Screen:  NA- unable to participate        Statin Therapy: y        Dysphagia Screen: [x] PASS [] FAIL       Smoking [] YES [x] NO        Afib [] YES [x] NO       Stroke Education [x] YES [] NO        Impression:    Cerebral embolism with cerebral infarction. Probable left MCA distribution stroke involving subcortical structure like corona radiata - likely etiology being large vessel disease i.e. symptomatic intracranial atherosclerosis leading to artery to artery embolism versus focal cerebral hypoperfusion versus small vessel disease and less likely to be due to embolic stroke from a proximal source like cardiac/paradoxical source of embolism. 89yo RH Azeri woman with a PMHx of HTN, Breast CA. s/p L. mastectomy, presented with RUE/RLE paresthesia and weakness. Patient reported she woke normal. Stated that at 930am on 4/7, she suddenly began experiencing unsteady gait, which she attributes to weakness of her RLE, as well as paresthesias of her RUE and RLE. Patient stated that there was mild improvement of the symptoms without resolution. EMS was activated.  At [facility], Code Stroke was activated. CTH showed no indication of acute infarct. Labs grossly WNL. VSS. Patient remained symptomatic with hemiparesis and hemisensory loss.  tPA administered at 12h01. NIHSS 2; MRS 1.     	    04.07.19     CT BRAIN:    No acute intracranial hemorrhage, mass effect, vasogenic edema, or     evidence of acute territorial infarct.    Chronic left foraminal infarct and moderate white matter microvascular     ischemic disease.    Ventricles moderately dilated slightly out of proportion to the sulci,     suggesting the presence of normal pressure hydrocephalus        CTA BRAIN:    No flow-limiting stenosis.    Mild narrowing of the distal right supraclinoid left ICA. Mild narrowing     of the P2 segment of the right PCA. Mild narrowing of the distal right M1     segment.    1 to 2 mm posterolaterally projecting left proximal supraclinoid     aneurysm. The neck measures approximately 1 mm in size.        CTA NECK:    55-60% short segment stenosis by NASCET criteria of the proximal left ICA     beginning 1 cm from its origin due to calcified plaque. Normal     flow-related enhancement of the more distal left ICA.        04.08.19    MRI Brain:    IMPRESSION: Large ventricles consistent with moderate atrophy. Small     vessel white matter ischemic changes. A few tiny foci of diffusion     restriction in the left posterior frontal white matter consistent with     acute infarcts.            Impression:    Cerebral embolism with cerebral infarction. Probable left MCA distribution stroke involving subcortical structure like corona radiata - likely etiology being large vessel disease i.e. symptomatic intracranial atherosclerosis leading to artery to artery embolism versus focal cerebral hypoperfusion versus small vessel disease          Vascular surgery consulted for possible CEA in setting of symptomatic ICA stenosis, carotid duplex demonstrates: 50-69% left ICA stenosis with calcified plaques noted in the left ICA/bulb, mild-moderate amount of right carotid plaque, intimal thickening of left carotid arteries, 89yo RH Sinhala woman with a PMHx of HTN, Breast CA. s/p L. mastectomy, presented with RUE/RLE paresthesia and weakness. Patient reported she woke normal. Stated that at 930am on 4/7, she suddenly began experiencing unsteady gait, which she attributes to weakness of her RLE, as well as paresthesias of her RUE and RLE. Patient stated that there was mild improvement of the symptoms without resolution. EMS was activated.  At [facility], Code Stroke was activated. CTH showed no indication of acute infarct. Labs grossly WNL. VSS. Patient remained symptomatic with hemiparesis and hemisensory loss.  tPA administered at 12h01. NIHSS 2; MRS 1.     	    04.07.19     CT BRAIN:    No acute intracranial hemorrhage, mass effect, vasogenic edema, or     evidence of acute territorial infarct.    Chronic left foraminal infarct and moderate white matter microvascular     ischemic disease.    Ventricles moderately dilated slightly out of proportion to the sulci,     suggesting the presence of normal pressure hydrocephalus        CTA BRAIN:    No flow-limiting stenosis.    Mild narrowing of the distal right supraclinoid left ICA. Mild narrowing     of the P2 segment of the right PCA. Mild narrowing of the distal right M1     segment.    1 to 2 mm posterolaterally projecting left proximal supraclinoid     aneurysm. The neck measures approximately 1 mm in size.        CTA NECK:    55-60% short segment stenosis by NASCET criteria of the proximal left ICA     beginning 1 cm from its origin due to calcified plaque. Normal     flow-related enhancement of the more distal left ICA.        04.08.19    MRI Brain:    IMPRESSION: Large ventricles consistent with moderate atrophy. Small     vessel white matter ischemic changes. A few tiny foci of diffusion     restriction in the left posterior frontal white matter consistent with     acute infarcts.            Impression:    Cerebral embolism with cerebral infarction. Probable left MCA distribution stroke involving subcortical structure like corona radiata - likely etiology being large vessel disease i.e. symptomatic intracranial atherosclerosis leading to artery to artery embolism versus focal cerebral hypoperfusion versus small vessel disease         Vascular surgery consulted for possible CEA in setting of symptomatic ICA stenosis, carotid duplex demonstrates: 50-69% left ICA stenosis with calcified plaques noted in the left ICA/bulb, mild-moderate amount of right carotid plaque, intimal thickening of left carotid arteries.      Pt was deemed a candidate for CEA vs carotid stent. Medical and Cardiology clearances were obtained and pt was cleared for surgery at this time. However, pt's daughter was initially refusing surgery at this time and therefore the surgery was put on hold. After speaking to the patient and patient's daughter several times about the risks and benefits of surgery for her current condition, pt and daughter were amenable to surgery but at a later date. Daughter continues to state that she would like to take her mother home to have her re-cooperate before surgery. Therefore, the surgery has been scheduled for 4/30/19 per the daughter. Plavix was started on HD5, a plavix loading dose was given, and daily plavix was scheduled to start on HD6, however, pt's daughter is demanding to take pt home on HD5 with the understanding of daily plavix needed for antiplatelet therapy. A lab test called [id number] genotype was sent for lab analysis prior to discharge. Per daughter, pt also has 24hr home care. Pt was initially set up with this care but due to the hesitancy of proceeding with the operation vs being discharged, home care was delayed until a final disposition was arranged. On day of discharge, the  talked to the home care agency to set up home care.        Pt and daughter were instructed to follow-up with Vascular surgery and Neurology in 1 wk after discharge. Pt stable for discharge today. 89yo RH Croatian woman with a PMHx of HTN, Breast CA. s/p L. mastectomy, presented with RUE/RLE paresthesia and weakness. Patient reported she woke normal. Stated that at 930am on 4/7, she suddenly began experiencing unsteady gait, which she attributes to weakness of her RLE, as well as paresthesias of her RUE and RLE. Patient stated that there was mild improvement of the symptoms without resolution. EMS was activated.  At [facility], Code Stroke was activated. CTH showed no indication of acute infarct. Labs grossly WNL. VSS. Patient remained symptomatic with hemiparesis and hemisensory loss.  tPA administered at 12h01. NIHSS 2; MRS 1.     	    04.07.19     CT BRAIN:    No acute intracranial hemorrhage, mass effect, vasogenic edema, or     evidence of acute territorial infarct.    Chronic left foraminal infarct and moderate white matter microvascular     ischemic disease.    Ventricles moderately dilated slightly out of proportion to the sulci,     suggesting the presence of normal pressure hydrocephalus        CTA BRAIN:    No flow-limiting stenosis.    Mild narrowing of the distal right supraclinoid left ICA. Mild narrowing     of the P2 segment of the right PCA. Mild narrowing of the distal right M1     segment.    1 to 2 mm posterolaterally projecting left proximal supraclinoid     aneurysm. The neck measures approximately 1 mm in size.        CTA NECK:    55-60% short segment stenosis by NASCET criteria of the proximal left ICA     beginning 1 cm from its origin due to calcified plaque. Normal     flow-related enhancement of the more distal left ICA.        04.08.19    MRI Brain:    IMPRESSION: Large ventricles consistent with moderate atrophy. Small     vessel white matter ischemic changes. A few tiny foci of diffusion     restriction in the left posterior frontal white matter consistent with     acute infarcts.            Impression:    Cerebral embolism with cerebral infarction. Probable left MCA distribution stroke involving subcortical structure like corona radiata - likely etiology being large vessel disease i.e. symptomatic intracranial atherosclerosis leading to artery to artery embolism versus focal cerebral hypoperfusion versus small vessel disease         Vascular surgery consulted for possible CEA in setting of symptomatic ICA stenosis, carotid duplex demonstrates: 50-69% left ICA stenosis with calcified plaques noted in the left ICA/bulb, mild-moderate amount of right carotid plaque, intimal thickening of left carotid arteries.      Pt was deemed a candidate for CEA vs carotid stent. Medical and Cardiology clearances were obtained and pt was cleared for surgery at this time. However, pt's daughter was initially refusing surgery at this time and therefore the surgery was put on hold. After speaking to the patient and patient's daughter several times about the risks and benefits of surgery for her current condition, pt and daughter were amenable to surgery but at a later date. Daughter continues to state that she would like to take her mother home to have her re-cooperate before surgery. Therefore, the surgery has been scheduled for 4/30/19 per the daughter. Plavix was started on HD5, a plavix loading dose was given, and daily plavix was scheduled to start on HD6, however, pt's daughter is demanding to take pt home on HD5 with the understanding of daily plavix needed for antiplatelet therapy. A lab test called [id number] genotype was sent for lab analysis prior to discharge. Per daughter, pt also has 24hr home care. Pt was initially set up with this care but due to the hesitancy of proceeding with the operation vs being discharged, home care was delayed until a final disposition was arranged. On day of discharge, the  talked to the home care agency to set up home care. The  was able to request the home care to begin the day after discharge, however, was not able to receive confirmation back that the home aid would be available to assist on the actual day of discharge. The  spoke to the daughter regarding this issue and the daughter understood that the home aid may not be available on day of discharge and understands and agrees that she will be taking the responsibility of her mother's care immediately upon discharge today.    Pt and daughter were instructed to follow-up with Vascular surgery and Neurology in 1 wk after discharge and to continue taking current medications as prescribed.

## 2019-04-09 NOTE — CONSULT NOTE ADULT - ASSESSMENT
ASSESSMENT:  87yo RH Ukrainian woman with a PMHx of HTN, Breast CA. s/p L. mastectomy, presents with RUE/RLE paresthesia and weakness, found to have L MCA stroke s/p tPA. Vascular surgery consulted for L ICA stenosis in the setting of L MCA stroke s/p tPA.     - Patient may be a candidate for CEA vs. carotid stent  - Please obtain carotid duplex for operative planning  - Please document medical/cardiology clearance for CEA vs. carotid stent procedures  - Vascular surgery will continue to follow    Discussed with Dr. Morgan Rosas PGY-2  Surgery Pager [phone]

## 2019-04-09 NOTE — CONSULT NOTE ADULT - SUBJECTIVE AND OBJECTIVE BOX
Vascular Surgery Consultation Note  =====================================================  89yo RH Solomon Islander woman with a PMHx of HTN, Breast CA. s/p L. mastectomy, presents with RUE/RLE paresthesia and weakness. Patient reports she woke normal. States that at 930am, she suddenly began experiencing unsteady gait, which she attributes to weakness of her RLE, as well as paresthesias of her RUE and RLE. Patient states that there was mild improvement of the symptoms without resolution. EMS was activated.   At [facility], Code Stroke was activated. CTH showed no indication of acute infarct. Labs grossly WNL. VSS. Patient remained symptomatic with hemiparesis and hemisensory loss.   Patient currently denies fevers, chills, ns, cp, sob, abd pain, n/v/d/c.     Benefits and risks of tPA were discussed with the patient, translated through HHA, as well as with daughter, fluent in english, over the phone.   All contraindications were reviewed with patient and patients daughter separately.   Both patient and patients daughter were agreeable to tPA administration.   tPA administered at 12h01.     NIHSS 2; MRS 1. (2019 22:53)    Vascular surgery consulted for L ICA stenosis in the setting of L MCA stroke s/p tPA. Patient has improved motor function in RUE/RLE s/p tPA but still continues to endorse weakness. She states that she would like to go home.     Allergies: penicillins (Unknown)    PAST MEDICAL & SURGICAL HISTORY:  Dementia  Breast cancer  HTN (hypertension)  Hypercholesterolemia  Hypertension  H/O mastectomy, left    ADVANCE DIRECTIVES: Presumed Full Code     REVIEW OF SYSTEMS:   General: Non-Contributory  Skin/Breast: Non-Contributory  Ophthalmologic: Non-Contributory  ENMT: Non-Contributory  Respiratory and Thorax: Non-Contributory  Cardiovascular: Non-Contributory  Gastrointestinal: Non-Contributory  Genitourinary: Non-Contributory  Musculoskeletal: Non-Contributory  Neurological: Non-Contributory  Psychiatric: Non-Contributory  Hematology/Lymphatics: Non-Contributory  Endocrine: Non-Contributory  Allergic/Immunologic: Non-Contributory    HOME MEDICATIONS:   Home Medications:  amLODIPine 5 mg oral tablet: 1 tab(s) orally once a day (2015 14:08)  cycloSPORINE 0.05% ophthalmic emulsion: 1 drop(s) to each affected eye every 12 hours (2015 14:08)  escitalopram 5 mg oral tablet: 1 tab(s) orally once a day (2019 10:19)  letrozole 2.5 mg oral tablet: 1 tab(s) orally once a day (2015 14:08)  losartan 25 mg oral tablet: 1 tab(s) orally once a day - total daily dose 75mg (2019 12:40)  losartan 50 mg oral tablet: 1 tab(s) orally once a day - total daily dose 75mg (2019 12:40)  omeprazole 40 mg oral delayed release capsule: 1 cap(s) orally once a day (2015 14:09)  pantoprazole 40 mg oral delayed release tablet: 1 tab(s) orally once a day, As Needed (2019 12:40)  Vitamin D3:  (2019 12:40)  zolpidem 5 mg oral tablet: 1 tab(s) orally once a day (at bedtime) (2015 14:09)    CURRENT MEDICATIONS:   --------------------------------------------------------------------------------------  Neurologic Medications  acetaminophen  IVPB .. 1000 milliGRAM(s) IV Intermittent once PRN Mild Pain (1 - 3), Moderate Pain (4 - 6)  escitalopram 5 milliGRAM(s) Oral daily    Respiratory Medications    Cardiovascular Medications  labetalol Injectable 10 milliGRAM(s) IV Push every 4 hours PRN SBP >180    Gastrointestinal Medications  pantoprazole    Tablet 40 milliGRAM(s) Oral before breakfast    Genitourinary Medications    Hematologic/Oncologic Medications  aspirin  chewable 81 milliGRAM(s) Oral daily  enoxaparin Injectable 40 milliGRAM(s) SubCutaneous daily    Antimicrobial/Immunologic Medications    Endocrine/Metabolic Medications  atorvastatin 80 milliGRAM(s) Oral at bedtime    Topical/Other Medications    --------------------------------------------------------------------------------------  VITAL SIGNS, INS/OUTS (last 24 hours):  --------------------------------------------------------------------------------------  Vital Signs Last 24 Hrs  T(C): 36.5 (2019 09:02), Max: 36.9 (2019 20:00)  T(F): 97.7 (2019 09:02), Max: 98.5 (2019 20:00)  HR: 67 (2019 09:02) (63 - 79)  BP: 154/85 (2019 09:02) (140/85 - 165/87)  BP(mean): 89 (2019 20:00) (89 - 108)  RR: 18 (2019 09:02) (18 - 22)  SpO2: 95% (2019 09:02) (95% - 99%)  --------------------------------------------------------------------------------------    EXAM  General: NAD, resting comfortably  Resp: unlabored breathing on RA  CV: HDS, rrr  Abd: soft, nontender, nondistended  Extr: RUE w/ bruising at AC, normal motor/sensation intact; palp radials b/l    LABS  --------------------------------------------------------------------------------------  CBC ( @ 05:42)                          13.8                     5.8     )--------------(  134<L>     --    % Neuts, --    % Lymphs, ANC: --                              41.5      BMP ( @ 05:42)       142     |  104     |  8     			Ca++ --      Ca 9.1          ---------------------------------( 115<H>		Mg --           3.4<L>  |  23      |  0.84  			Ph --        Urinalysis ( @ 12:55):     Color: Light Yellow / Appearance: Clear / S.011 / pH: 7.0 / Gluc: Negative / Ketones: Negative / Bili: Negative / Urobili: Negative / Protein :Negative / Nitrites: Negative / Leuk.Est: Negative / RBC:  / WBC:  / Sq Epi:  / Non Sq Epi:  / Bacteria      --------------------------------------------------------------------------------------    OTHER LABS    IMAGING RESULTS  < from: CT Angio Neck w/ IV Cont (19 @ 12:10) >  CT BRAIN:    No acute intracranial hemorrhage, mass effect, vasogenic edema, or   evidence of acute territorial infarct.    Chronic left foraminal infarct and moderate white matter microvascular   ischemic disease.    Ventricles moderately dilated slightly out of proportion to the sulci,   suggesting the presence of normal pressure hydrocephalus    CTA BRAIN:    No flow-limiting stenosis.    Mild narrowing of the distal right supraclinoid left ICA. Mild narrowing   of the P2 segment of the right PCA. Mild narrowing of the distal right M1   segment.    1 to 2 mm posterolaterally projecting left proximal supraclinoid   aneurysm. The neck measures approximately 1 mm in size.    CTA NECK:    55-60% short segment stenosis by NASCET criteria of the proximal left ICA   beginning 1 cm from its origin due to calcified plaque. Normal   flow-related enhancement of the more distal left ICA.    SURJIT ARTIS M.D., ATTENDING RADIOLOGIST  This document has been electronically signed. 2019 12:17PM    < end of copied text >

## 2019-04-09 NOTE — DISCHARGE NOTE PROVIDER - NSDCCPCAREPLAN_GEN_ALL_CORE_FT
PRINCIPAL DISCHARGE DIAGNOSIS  Diagnosis: CVA (cerebral vascular accident)  Assessment and Plan of Treatment: Cerebral embolism with cerebral infarction. Left MCA distribution stroke involving left posterior frontal white matter.     - Likely etiology being large vessel disease i.e. symptomatic intracranial atherosclerosis leading to artery to artery embolism versus focal cerebral hypoperfusion versus small vessel disease and less likely to be due to embolic stroke from a proximal source like cardiac/paradoxical source of embolism.  - f/u outpatient with Dr. Hernandez, Vascular Neurology  - Continue Aspirin 81mg Daily  - Continue Atorvastatin 80mg Daily PRINCIPAL DISCHARGE DIAGNOSIS  Diagnosis: CVA (cerebral vascular accident)  Assessment and Plan of Treatment: Cerebral embolism with cerebral infarction. Left MCA distribution stroke involving left posterior frontal white matter.     - Likely etiology being large vessel disease i.e. symptomatic intracranial atherosclerosis leading to artery to artery embolism versus focal cerebral hypoperfusion versus small vessel disease and less likely to be due to embolic stroke from a proximal source like cardiac/paradoxical source of embolism.  - f/u outpatient with Dr. Hernandez, Vascular Neurology  - Continue Aspirin 81mg Daily  - Continue Atorvastatin 80mg Daily  -f/u outpatient with Dr. Mora, Vascular Surgery  - Continue Plavix 75mg Daily

## 2019-04-09 NOTE — DISCHARGE NOTE NURSING/CASE MANAGEMENT/SOCIAL WORK - NSDCPEPTSTRK_GEN_ALL_CORE
Call 911 for stroke/Stroke support groups for patients, families, and friends/Stroke education booklet/Stroke warning signs and symptoms/Signs and symptoms of stroke/Need for follow up after discharge/Prescribed medications/Risk factors for stroke

## 2019-04-09 NOTE — PROGRESS NOTE ADULT - ASSESSMENT
ASSESSMENT: 88 year old woman with vascular risk factors of age and HTN is evaluated at [facility] for right-sided weakness. She was reported to be last known well at around 9:30 AM 4/7/19 when she was brushing her teeth. Soon after that she noted right-sided weakness and sensory paresthesia, prompting her presentation to [facility]. CT brain on admission did not show any evidence of acute infarct or hemorrhage. MRI demonstrated left hemispheric infarcts. CTA head and neck  showed left supraclinoid ICA stenosis and moderate to severe proximal left ICA stenosis.    Impression:  Cerebral embolism with cerebral infarction.  Left posterior frontal distribution infarcts- likely etiology being large vessel disease.     NEURO: neurologically without acute change, continue close monitoring for neurologic deterioration, permissive HTN for now in setting of carotid stenosis, vascular surgery consulted for possible CEA in setting of symptomatic ICA stenosis titrate statin to LDL goal less than 70, MR imaging as noted. Physical therapy/OT eval for home iwth home therapy, assist.     ANTITHROMBOTIC THERAPY: ASA      PULMONARY: protecting airway, saturating well     CARDIOVASCULAR: check TTE, cardiac monitoring with no acute events        SBP goal:  <180/105 mmHg , eventual normotension     GASTROINTESTINAL:  dysphagia screen passed, tolerating diet, advance to regular      Diet: regular     RENAL: BUN/Cr within range, ensure adequate hydration, good urine output      Na Goal: Greater than 135     Serna: n     HEMATOLOGY: H/H without anemia, Platelets 134, monitor for further thrombocytopenia, LE duplex negative for DVT      DVT ppx: lovenox     ID: afebrile, no leukocytosis, + frequency for urination,  UA negative     DISPOSITION: Home with home therapy and assist.       CORE MEASURES:        Admission NIHSS:2      TPA: [x] YES [] NO      LDL/HDL: 136/38     Depression Screen:  NA- unable to participate      Statin Therapy: y      Dysphagia Screen: [x] PASS [] FAIL     Smoking [] YES [x] NO      Afib [] YES [x] NO     Stroke Education [x] YES [] NO    Obtain screening lower extremity venous ultrasound in patients who meet 1 or more of the following criteria as patient is high risk for DVT/PE on admission:   [] History of DVT/PE  [x]Hypercoagulable states (Factor V Leiden, Cancer, OCP, etc. ) -history of breast ca   []Prolonged immobility (hemiplegia/hemiparesis/post operative or any other extended immobilization)  [] Transferred from outside facility (Rehab or Long term care)  [] Age </= to 50

## 2019-04-09 NOTE — PROGRESS NOTE ADULT - SUBJECTIVE AND OBJECTIVE BOX
THE PATIENT WAS SEEN AND EXAMINED BY ME WITH THE HOUSESTAFF AND STROKE TEAM DURING MORNING ROUNDS.   HPI:  89yo RH [place] woman with a PMHx of HTN, Breast CA. s/p L. mastectomy, presented with RUE/RLE paresthesia and weakness. Patient reported she woke normal. Stated that at 930am day of admission, she suddenly began experiencing unsteady gait, which she attributes to weakness of her RLE, as well as paresthesias of her RUE and RLE. Patient stated that there was mild improvement of the symptoms without resolution. EMS was activated.  At [facility], Code Stroke was activated. CTH showed no indication of acute infarct. Labs grossly WNL. VSS. Patient remained symptomatic with hemiparesis and hemisensory loss.  tPA administered at 12h01. NIHSS 2; MRS 1.      SUBJECTIVE: No events overnight.  No new neurologic complaints.      acetaminophen  IVPB .. 1000 milliGRAM(s) IV Intermittent once PRN  aspirin  chewable 81 milliGRAM(s) Oral daily  atorvastatin 80 milliGRAM(s) Oral at bedtime  enoxaparin Injectable 40 milliGRAM(s) SubCutaneous daily  escitalopram 5 milliGRAM(s) Oral daily  labetalol Injectable 10 milliGRAM(s) IV Push every 4 hours PRN  pantoprazole    Tablet 40 milliGRAM(s) Oral before breakfast      PHYSICAL EXAM:   Vital Signs Last 24 Hrs  T(C): 36.5 (09 Apr 2019 09:02), Max: 36.9 (08 Apr 2019 20:00)  T(F): 97.7 (09 Apr 2019 09:02), Max: 98.5 (08 Apr 2019 20:00)  HR: 67 (09 Apr 2019 09:02) (63 - 79)  BP: 154/85 (09 Apr 2019 09:02) (140/85 - 165/87)  BP(mean): 89 (08 Apr 2019 20:00) (89 - 108)  RR: 18 (09 Apr 2019 09:02) (18 - 22)  SpO2: 95% (09 Apr 2019 09:02) (95% - 99%)    General: No acute distress  HEENT: EOM intact, visual fields full  Abdomen: Soft, nontender, nondistended   Extremities: No edema    NEUROLOGICAL EXAM:  Mental status: Awake, alert, oriented x self, states [facility] for location, follows simple commands, reptation intact, perseverates   Cranial Nerves: trace right facial droopy, no nystagmus, subtle dysarthria,  tongue midline  Motor exam: slight right hemiparesis: RUE 4/5 distally, proximally 5/5, RLE 4/5, 5/5 LUE, 5/5 LLE   Sensation: Intact to light touch   Coordination/ Gait: gait not assessed     LABS:                        13.8   5.8   )-----------( 134      ( 08 Apr 2019 05:42 )             41.5    04-08    142  |  104  |  8   ----------------------------<  115<H>  3.4<L>   |  23  |  0.84    Ca    9.1      08 Apr 2019 05:42    TPro  6.5  /  Alb  4.2  /  TBili  0.6  /  DBili  x   /  AST  19  /  ALT  16  /  AlkPhos  64  04-07  PT/INR - ( 07 Apr 2019 11:29 )   PT: 12.3 sec;   INR: 1.08 ratio         PTT - ( 07 Apr 2019 11:29 )  PTT:30.7 sec  Hemoglobin A1C, Whole Blood: 5.9 % (04-08 @ 08:49)  Hemoglobin A1C, Whole Blood: 5.9 % (04-08 @ 08:49)      IMAGING: Reviewed by me.   04.07.19   CT BRAIN:  No acute intracranial hemorrhage, mass effect, vasogenic edema, or   evidence of acute territorial infarct.  Chronic left foraminal infarct and moderate white matter microvascular   ischemic disease.  Ventricles moderately dilated slightly out of proportion to the sulci,   suggesting the presence of normal pressure hydrocephalus    CTA BRAIN:  No flow-limiting stenosis.  Mild narrowing of the distal right supraclinoid left ICA. Mild narrowing   of the P2 segment of the right PCA. Mild narrowing of the distal right M1   segment.  1 to 2 mm posterolaterally projecting left proximal supraclinoid   aneurysm. The neck measures approximately 1 mm in size.    CTA NECK:  55-60% short segment stenosis by NASCET criteria of the proximal left ICA   beginning 1 cm from its origin due to calcified plaque. Normal   flow-related enhancement of the more distal left ICA.    MR Head No Cont (04.08.19)   Large ventricles consistent with moderate atrophy. Small   vessel white matter ischemic changes. A few tiny foci of diffusion   restriction in the left posterior frontal white matter consistent with   acute infarcts. THE PATIENT WAS SEEN AND EXAMINED BY ME WITH THE HOUSESTAFF AND STROKE TEAM DURING MORNING ROUNDS.   HPI:  89yo RH Spanish woman with a PMHx of HTN, Breast CA. s/p L. mastectomy, presented with RUE/RLE paresthesia and weakness. Patient reported she woke normal. Stated that at 930am day of admission, she suddenly began experiencing unsteady gait, which she attributes to weakness of her RLE, as well as paresthesias of her RUE and RLE. Patient stated that there was mild improvement of the symptoms without resolution. EMS was activated.  At [facility], Code Stroke was activated. CTH showed no indication of acute infarct. Labs grossly WNL. VSS. Patient remained symptomatic with hemiparesis and hemisensory loss.  tPA administered at 12h01. NIHSS 2; MRS 1.      SUBJECTIVE: No events overnight.  No new neurologic complaints.     [id number]     acetaminophen  IVPB .. 1000 milliGRAM(s) IV Intermittent once PRN  aspirin  chewable 81 milliGRAM(s) Oral daily  atorvastatin 80 milliGRAM(s) Oral at bedtime  enoxaparin Injectable 40 milliGRAM(s) SubCutaneous daily  escitalopram 5 milliGRAM(s) Oral daily  labetalol Injectable 10 milliGRAM(s) IV Push every 4 hours PRN  pantoprazole    Tablet 40 milliGRAM(s) Oral before breakfast      PHYSICAL EXAM:   Vital Signs Last 24 Hrs  T(C): 36.5 (09 Apr 2019 09:02), Max: 36.9 (08 Apr 2019 20:00)  T(F): 97.7 (09 Apr 2019 09:02), Max: 98.5 (08 Apr 2019 20:00)  HR: 67 (09 Apr 2019 09:02) (63 - 79)  BP: 154/85 (09 Apr 2019 09:02) (140/85 - 165/87)  BP(mean): 89 (08 Apr 2019 20:00) (89 - 108)  RR: 18 (09 Apr 2019 09:02) (18 - 22)  SpO2: 95% (09 Apr 2019 09:02) (95% - 99%)    General: No acute distress  HEENT: EOM intact, visual fields full  Abdomen: Soft, nontender, nondistended   Extremities: No edema    NEUROLOGICAL EXAM:  Mental status: Awake, alert, oriented x person, place, time follows simple commands, reptation intact  Cranial Nerves: trace right facial droopy, no nystagmus, subtle dysarthria,  tongue midline  Motor exam: slight right hemiparesis: RUE 4/5 distally, proximally 5/5, RLE 4/5, 5/5 LUE, 5/5 LLE   Sensation: Intact to light touch   Coordination/ Gait: gait not assessed     LABS:                        13.8   5.8   )-----------( 134      ( 08 Apr 2019 05:42 )             41.5    04-08    142  |  104  |  8   ----------------------------<  115<H>  3.4<L>   |  23  |  0.84    Ca    9.1      08 Apr 2019 05:42    TPro  6.5  /  Alb  4.2  /  TBili  0.6  /  DBili  x   /  AST  19  /  ALT  16  /  AlkPhos  64  04-07  PT/INR - ( 07 Apr 2019 11:29 )   PT: 12.3 sec;   INR: 1.08 ratio         PTT - ( 07 Apr 2019 11:29 )  PTT:30.7 sec  Hemoglobin A1C, Whole Blood: 5.9 % (04-08 @ 08:49)  Hemoglobin A1C, Whole Blood: 5.9 % (04-08 @ 08:49)      IMAGING: Reviewed by me.   04.07.19   CT BRAIN:  No acute intracranial hemorrhage, mass effect, vasogenic edema, or   evidence of acute territorial infarct.  Chronic left foraminal infarct and moderate white matter microvascular   ischemic disease.  Ventricles moderately dilated slightly out of proportion to the sulci,   suggesting the presence of normal pressure hydrocephalus    CTA BRAIN:  No flow-limiting stenosis.  Mild narrowing of the distal right supraclinoid left ICA. Mild narrowing   of the P2 segment of the right PCA. Mild narrowing of the distal right M1   segment.  1 to 2 mm posterolaterally projecting left proximal supraclinoid   aneurysm. The neck measures approximately 1 mm in size.    CTA NECK:  55-60% short segment stenosis by NASCET criteria of the proximal left ICA   beginning 1 cm from its origin due to calcified plaque. Normal   flow-related enhancement of the more distal left ICA.    MR Head No Cont (04.08.19)   Large ventricles consistent with moderate atrophy. Small   vessel white matter ischemic changes. A few tiny foci of diffusion   restriction in the left posterior frontal white matter consistent with   acute infarcts.

## 2019-04-10 LAB
ANION GAP SERPL CALC-SCNC: 14 MMOL/L — SIGNIFICANT CHANGE UP (ref 5–17)
BUN SERPL-MCNC: 9 MG/DL — SIGNIFICANT CHANGE UP (ref 7–23)
CALCIUM SERPL-MCNC: 9.4 MG/DL — SIGNIFICANT CHANGE UP (ref 8.4–10.5)
CHLORIDE SERPL-SCNC: 104 MMOL/L — SIGNIFICANT CHANGE UP (ref 96–108)
CO2 SERPL-SCNC: 24 MMOL/L — SIGNIFICANT CHANGE UP (ref 22–31)
CREAT SERPL-MCNC: 0.83 MG/DL — SIGNIFICANT CHANGE UP (ref 0.5–1.3)
GLUCOSE SERPL-MCNC: 115 MG/DL — HIGH (ref 70–99)
HCT VFR BLD CALC: 44.9 % — SIGNIFICANT CHANGE UP (ref 34.5–45)
HGB BLD-MCNC: 14.5 G/DL — SIGNIFICANT CHANGE UP (ref 11.5–15.5)
MCHC RBC-ENTMCNC: 28 PG — SIGNIFICANT CHANGE UP (ref 27–34)
MCHC RBC-ENTMCNC: 32.3 GM/DL — SIGNIFICANT CHANGE UP (ref 32–36)
MCV RBC AUTO: 86.8 FL — SIGNIFICANT CHANGE UP (ref 80–100)
PLATELET # BLD AUTO: 151 K/UL — SIGNIFICANT CHANGE UP (ref 150–400)
POTASSIUM SERPL-MCNC: 4 MMOL/L — SIGNIFICANT CHANGE UP (ref 3.5–5.3)
POTASSIUM SERPL-SCNC: 4 MMOL/L — SIGNIFICANT CHANGE UP (ref 3.5–5.3)
RBC # BLD: 5.17 M/UL — SIGNIFICANT CHANGE UP (ref 3.8–5.2)
RBC # FLD: 13.5 % — SIGNIFICANT CHANGE UP (ref 10.3–14.5)
SODIUM SERPL-SCNC: 142 MMOL/L — SIGNIFICANT CHANGE UP (ref 135–145)
WBC # BLD: 5.24 K/UL — SIGNIFICANT CHANGE UP (ref 3.8–10.5)
WBC # FLD AUTO: 5.24 K/UL — SIGNIFICANT CHANGE UP (ref 3.8–10.5)

## 2019-04-10 RX ADMIN — ATORVASTATIN CALCIUM 80 MILLIGRAM(S): 80 TABLET, FILM COATED ORAL at 23:41

## 2019-04-10 RX ADMIN — PANTOPRAZOLE SODIUM 40 MILLIGRAM(S): 20 TABLET, DELAYED RELEASE ORAL at 05:29

## 2019-04-10 RX ADMIN — ENOXAPARIN SODIUM 40 MILLIGRAM(S): 100 INJECTION SUBCUTANEOUS at 11:54

## 2019-04-10 RX ADMIN — ESCITALOPRAM OXALATE 5 MILLIGRAM(S): 10 TABLET, FILM COATED ORAL at 11:55

## 2019-04-10 RX ADMIN — Medication 81 MILLIGRAM(S): at 11:55

## 2019-04-10 NOTE — PROGRESS NOTE ADULT - SUBJECTIVE AND OBJECTIVE BOX
Patient is a 88y old  Female who presents with a chief complaint of R sided weakness (07 Apr 2019 16:00)      SUBJECTIVE / OVERNIGHT EVENTS: feels better.   Review of Systems  chest pain no  palpitations no  sob no  nausea no  headache no    MEDICATIONS  (STANDING):  aspirin  chewable 81 milliGRAM(s) Oral daily  atorvastatin 80 milliGRAM(s) Oral at bedtime  enoxaparin Injectable 40 milliGRAM(s) SubCutaneous daily  escitalopram 5 milliGRAM(s) Oral daily  pantoprazole    Tablet 40 milliGRAM(s) Oral before breakfast    MEDICATIONS  (PRN):  acetaminophen  IVPB .. 1000 milliGRAM(s) IV Intermittent once PRN Mild Pain (1 - 3), Moderate Pain (4 - 6)  labetalol Injectable 10 milliGRAM(s) IV Push every 4 hours PRN SBP >180      Vital Signs Last 24 Hrs  T(C): 36.5 (10 Apr 2019 15:43), Max: 36.9 (09 Apr 2019 23:50)  T(F): 97.7 (10 Apr 2019 15:43), Max: 98.4 (09 Apr 2019 23:50)  HR: 65 (10 Apr 2019 15:43) (55 - 65)  BP: 149/74 (10 Apr 2019 15:43) (112/61 - 153/68)  BP(mean): --  RR: 18 (10 Apr 2019 15:43) (18 - 18)  SpO2: 97% (10 Apr 2019 15:43) (95% - 97%)    PHYSICAL EXAM:  GENERAL: NAD, well-developed  HEAD:  Atraumatic, Normocephalic  EYES: EOMI, PERRLA, conjunctiva and sclera clear  NECK: Supple, No JVD  CHEST/LUNG: Clear to auscultation bilaterally; No wheeze  HEART: Regular rate and rhythm; No murmurs, rubs, or gallops  ABDOMEN: Soft, Nontender, Nondistended; Bowel sounds present  EXTREMITIES:  2+ Peripheral Pulses, No clubbing, cyanosis, or edema  PSYCH: confused  NEUROLOGY: R sided weakness.  SKIN: No rashes or lesions    LABS:                        14.5   5.24  )-----------( 151      ( 10 Apr 2019 10:05 )             44.9     04-10    142  |  104  |  9   ----------------------------<  115<H>  4.0   |  24  |  0.83    Ca    9.4      10 Apr 2019 06:35                  RADIOLOGY & ADDITIONAL TESTS:    Imaging Personally Reviewed:    Consultant(s) Notes Reviewed:      Care Discussed with Consultants/Other Providers:

## 2019-04-10 NOTE — CONSULT NOTE ADULT - ASSESSMENT
ACUTE CVA  HTN  Caroid disease       cont current meds  BP control once carotid disease is intervened   Based on current ACC/AHA guidelines, patient history and physical exam, the patient is considered to have elevated risk  no CV objection to proceed to OR for this time sensitive surgery     Pt is known to Dr Levine , he will take over as of tomorrow

## 2019-04-10 NOTE — PROGRESS NOTE ADULT - ASSESSMENT
ASSESSMENT: 88 year old woman with vascular risk factors of age and HTN is evaluated at [facility] for right-sided weakness. She was reported to be last known well at around 9:30 AM 4/7/19 when she was brushing her teeth. Soon after that she noted right-sided weakness and sensory paresthesia, prompting her presentation to [facility]. CT brain on admission did not show any evidence of acute infarct or hemorrhage. MRI demonstrated left hemispheric infarcts. CTA head and neck  showed left supraclinoid ICA stenosis and moderate to severe proximal left ICA stenosis.    Impression:  Cerebral embolism with cerebral infarction.  Left posterior frontal distribution infarcts- likely etiology being large vessel disease (i.e symptomatic left ICA stenosis).     NEURO: neurologically without acute change, continue close monitoring for neurologic deterioration, permissive HTN for now in setting of carotid stenosis, vascular surgery consulted for possible CEA natali setting of symptomatic ICA stenosis, carotid duplex results pending as recommended, titrate statin to LDL goal less than 70, MR imaging as noted. Physical therapy/OT eval for home with home therapy, assist.     ANTITHROMBOTIC THERAPY: ASA      PULMONARY: protecting airway, saturating well     CARDIOVASCULAR: check TTE, cardiac monitoring with no acute events        SBP goal:  <180/105 mmHg , eventual normotension     GASTROINTESTINAL:  dysphagia screen passed, tolerating diet      Diet: regular     RENAL: BUN/Cr within range, ensure adequate hydration, good urine output      Na Goal: Greater than 135     Serna: n     HEMATOLOGY: H/H without anemia, Platelets 134, monitor for further thrombocytopenia, LE duplex negative for DVT      DVT ppx: lovenox     ID: afebrile, no leukocytosis, no si/sx of infection at this time     DISPOSITION: Home with home therapy and assist.       CORE MEASURES:        Admission NIHSS:2      TPA: [x] YES [] NO      LDL/HDL: 136/38     Depression Screen:  NA- unable to participate      Statin Therapy: y      Dysphagia Screen: [x] PASS [] FAIL     Smoking [] YES [x] NO      Afib [] YES [x] NO     Stroke Education [x] YES [] NO    Obtain screening lower extremity venous ultrasound in patients who meet 1 or more of the following criteria as patient is high risk for DVT/PE on admission:   [] History of DVT/PE  [x]Hypercoagulable states (Factor V Leiden, Cancer, OCP, etc. ) -history of breast ca   []Prolonged immobility (hemiplegia/hemiparesis/post operative or any other extended immobilization)  [] Transferred from outside facility (Rehab or Long term care)  [] Age </= to 50 ASSESSMENT: 88 year old woman with vascular risk factors of age and HTN is evaluated at [facility] for right-sided weakness. She was reported to be last known well at around 9:30 AM 4/7/19 when she was brushing her teeth. Soon after that she noted right-sided weakness and sensory paresthesia, prompting her presentation to [facility]. CT brain on admission did not show any evidence of acute infarct or hemorrhage. MRI demonstrated left hemispheric infarcts. CTA head and neck  showed left supraclinoid ICA stenosis and moderate to severe proximal left ICA stenosis.    Impression:  Cerebral embolism with cerebral infarction.  Left posterior frontal distribution infarcts- likely etiology being large vessel disease (i.e symptomatic left ICA stenosis).     NEURO: neurologically without acute change, continue close monitoring for neurologic deterioration, permissive HTN for now in setting of carotid stenosis, vascular surgery consulted for possible CEA in setting of symptomatic ICA stenosis, carotid duplex demonstrates: 50-69% left ICA stenosis with calcified plaques noted in the left ICA/bulb, mild-moderate amount of right carotid plaque, intimal thickening of left carotid arteries,  titrate statin to LDL goal less than 70, MR imaging as noted. Physical therapy/OT eval for home with home therapy, assist.     ANTITHROMBOTIC THERAPY: ASA      PULMONARY: protecting airway, saturating well     CARDIOVASCULAR:  TTE: ef70%, mitral annular calcification, mild AR, aortic atherosclerosis, minimal LA, cardiac monitoring with no acute events        SBP goal:  <180/105 mmHg , eventual normotension     GASTROINTESTINAL:  dysphagia screen passed, tolerating diet      Diet: regular     RENAL: BUN/Cr within range, ensure adequate hydration, good urine output      Na Goal: Greater than 135     Serna: n     HEMATOLOGY: H/H without anemia, Platelets 151, monitor for further thrombocytopenia, LE duplex negative for DVT      DVT ppx: lovenox     ID: afebrile, no leukocytosis, no si/sx of infection at this time     DISPOSITION: Home with home therapy and assist.       CORE MEASURES:        Admission NIHSS:2      TPA: [x] YES [] NO      LDL/HDL: 136/38     Depression Screen:  NA- unable to participate, appears with good affect and conversation, would like to go home.     Statin Therapy: y      Dysphagia Screen: [x] PASS [] FAIL     Smoking [] YES [x] NO      Afib [] YES [x] NO     Stroke Education [x] YES [] NO    Obtain screening lower extremity venous ultrasound in patients who meet 1 or more of the following criteria as patient is high risk for DVT/PE on admission:   [] History of DVT/PE  [x]Hypercoagulable states (Factor V Leiden, Cancer, OCP, etc. ) -history of breast ca   []Prolonged immobility (hemiplegia/hemiparesis/post operative or any other extended immobilization)  [] Transferred from outside facility (Rehab or Long term care)  [] Age </= to 50 ASSESSMENT: 88 year old woman with vascular risk factors of age and HTN is evaluated at [facility] for right-sided weakness. She was reported to be last known well at around 9:30 AM 4/7/19 when she was brushing her teeth. Soon after that she noted right-sided weakness and sensory paresthesia, prompting her presentation to [facility]. CT brain on admission did not show any evidence of acute infarct or hemorrhage. MRI demonstrated left hemispheric infarcts. CTA head and neck  showed left supraclinoid ICA stenosis and moderate to severe proximal left ICA stenosis.    Impression:  Cerebral embolism with cerebral infarction.  Left posterior frontal distribution infarcts- likely etiology being large vessel disease (i.e symptomatic left ICA stenosis).     NEURO: neurologically without acute change, continue close monitoring for neurologic deterioration, permissive HTN for now in setting of carotid stenosis, vascular surgery consulted for possible CEA in setting of symptomatic ICA stenosis, carotid duplex demonstrates: 50-69% left ICA stenosis with calcified plaques noted in the left ICA/bulb, mild-moderate amount of right carotid plaque, intimal thickening of left carotid arteries,  titrate statin to LDL goal less than 70, MR imaging as noted. Physical therapy/OT eval for home with home therapy, assist.     ANTITHROMBOTIC THERAPY: ASA      PULMONARY: protecting airway, saturating well     CARDIOVASCULAR:  TTE: ef70%, mitral annular calcification, mild AR, aortic atherosclerosis, minimal NC, cardiac monitoring with no acute events        SBP goal:  <180/105 mmHg , eventual normotension     GASTROINTESTINAL:  dysphagia screen passed, tolerating diet      Diet: regular     RENAL: BUN/Cr within range, ensure adequate hydration, good urine output      Na Goal: Greater than 135     Serna: n     HEMATOLOGY: H/H without anemia, Platelets 151, monitor for further thrombocytopenia, LE duplex negative for DVT      DVT ppx: lovenox     ID: afebrile, no leukocytosis, no si/sx of infection at this time     Other: current medical condition and plan of care d/w patient, concerns and questions addressed.    DISPOSITION: Home with home therapy and assist.       CORE MEASURES:        Admission NIHSS:2      TPA: [x] YES [] NO      LDL/HDL: 136/38     Depression Screen:  NA- unable to participate, appears with good affect and conversation, would like to go home.     Statin Therapy: y      Dysphagia Screen: [x] PASS [] FAIL     Smoking [] YES [x] NO      Afib [] YES [x] NO     Stroke Education [x] YES [] NO    Obtain screening lower extremity venous ultrasound in patients who meet 1 or more of the following criteria as patient is high risk for DVT/PE on admission:   [] History of DVT/PE  [x]Hypercoagulable states (Factor V Leiden, Cancer, OCP, etc. ) -history of breast ca   []Prolonged immobility (hemiplegia/hemiparesis/post operative or any other extended immobilization)  [] Transferred from outside facility (Rehab or Long term care)  [] Age </= to 50

## 2019-04-10 NOTE — PROGRESS NOTE ADULT - ASSESSMENT
88 f with  CVA  - s/p TPA  - Echo pending     HTN  - control    Carotid stenosis  - vascular follow re CEA vs stent.    Dementia  - supportive care    No acute medical condition identified to postpone planned surgical intervention, though patient is above average risk in view of recent CVA.  d/w daughter QA  Mau Mc MD pager [phone]

## 2019-04-10 NOTE — PROGRESS NOTE ADULT - ASSESSMENT
89yo RH [place] woman with a PMHx of HTN, Breast CA. s/p L. mastectomy, presents with RUE/RLE paresthesia and weakness, found to have L MCA stroke s/p tPA. Vascular surgery consulted for L ICA stenosis in the setting of L MCA stroke s/p tPA.     - Patient may be a candidate for CEA vs. carotid stent  - Please obtain carotid duplex for operative planning: performed, pending read   - Please document medical/cardiology clearance for CEA vs. carotid stent procedures  - Vascular surgery will continue to follow

## 2019-04-10 NOTE — PROGRESS NOTE ADULT - SUBJECTIVE AND OBJECTIVE BOX
THE PATIENT WAS SEEN AND EXAMINED BY ME WITH THE HOUSESTAFF AND STROKE TEAM DURING MORNING ROUNDS.   HPI:  87yo RH [place] woman with a PMHx of HTN, Breast CA. s/p L. mastectomy, presented with RUE/RLE paresthesia and weakness. Patient reported she woke normal. Stated that at 930am day of admission, she suddenly began experiencing unsteady gait, which she attributes to weakness of her RLE, as well as paresthesias of her RUE and RLE. Patient stated that there was mild improvement of the symptoms without resolution. EMS was activated.  At [facility], Code Stroke was activated. CTH showed no indication of acute infarct. Labs grossly WNL. VSS. Patient remained symptomatic with hemiparesis and hemisensory loss.  tPA administered at 12h01. NIHSS 2; MRS 1.      SUBJECTIVE: No events overnight.  No new neurologic complaints.      acetaminophen  IVPB .. 1000 milliGRAM(s) IV Intermittent once PRN  aspirin  chewable 81 milliGRAM(s) Oral daily  atorvastatin 80 milliGRAM(s) Oral at bedtime  enoxaparin Injectable 40 milliGRAM(s) SubCutaneous daily  escitalopram 5 milliGRAM(s) Oral daily  labetalol Injectable 10 milliGRAM(s) IV Push every 4 hours PRN  pantoprazole    Tablet 40 milliGRAM(s) Oral before breakfast      PHYSICAL EXAM:   Vital Signs Last 24 Hrs  T(C): 36.9 (10 Apr 2019 07:36), Max: 36.9 (09 Apr 2019 23:50)  T(F): 98.4 (10 Apr 2019 07:36), Max: 98.4 (09 Apr 2019 23:50)  HR: 55 (10 Apr 2019 07:36) (55 - 70)  BP: 153/68 (10 Apr 2019 07:36) (112/61 - 154/85)  BP(mean): --  RR: 18 (10 Apr 2019 07:36) (18 - 18)  SpO2: 95% (10 Apr 2019 07:36) (95% - 97%)    General: No acute distress  HEENT: EOM intact, visual fields full  Abdomen: Soft, nontender, nondistended   Extremities: No edema    NEUROLOGICAL EXAM:  Mental status: Awake, alert, oriented x person, place, time follows simple commands, reptation intact  Cranial Nerves: trace right facial droopy, no nystagmus, subtle dysarthria,  tongue midline  Motor exam: slight right hemiparesis: RUE 4/5 distally, proximally 5/5, RLE 4/5, 5/5 LUE, 5/5 LLE   Sensation: Intact to light touch   Coordination/ Gait: gait not assessed     LABS:   04-10    142  |  104  |  9   ----------------------------<  115<H>  4.0   |  24  |  0.83    Ca    9.4      10 Apr 2019 06:35      Hemoglobin A1C, Whole Blood: 5.9 % (04-08 @ 08:49)  Hemoglobin A1C, Whole Blood: 5.9 % (04-08 @ 08:49)      IMAGING: Reviewed by me.     04.07.19   CT BRAIN:  No acute intracranial hemorrhage, mass effect, vasogenic edema, or   evidence of acute territorial infarct.  Chronic left foraminal infarct and moderate white matter microvascular   ischemic disease.  Ventricles moderately dilated slightly out of proportion to the sulci,   suggesting the presence of normal pressure hydrocephalus    CTA BRAIN:  No flow-limiting stenosis.  Mild narrowing of the distal right supraclinoid left ICA. Mild narrowing   of the P2 segment of the right PCA. Mild narrowing of the distal right M1   segment.  1 to 2 mm posterolaterally projecting left proximal supraclinoid   aneurysm. The neck measures approximately 1 mm in size.    CTA NECK:  55-60% short segment stenosis by NASCET criteria of the proximal left ICA   beginning 1 cm from its origin due to calcified plaque. Normal   flow-related enhancement of the more distal left ICA.    MR Head No Cont (04.08.19)   Large ventricles consistent with moderate atrophy. Small   vessel white matter ischemic changes. A few tiny foci of diffusion   restriction in the left posterior frontal white matter consistent with   acute infarcts. THE PATIENT WAS SEEN AND EXAMINED BY ME WITH THE HOUSESTAFF AND STROKE TEAM DURING MORNING ROUNDS.   HPI:  87yo RH [place] woman with a PMHx of HTN, Breast CA. s/p L. mastectomy, presented with RUE/RLE paresthesia and weakness. Patient reported she woke normal. Stated that at 930am day of admission, she suddenly began experiencing unsteady gait, which she attributes to weakness of her RLE, as well as paresthesias of her RUE and RLE. Patient stated that there was mild improvement of the symptoms without resolution. EMS was activated.  At [facility], Code Stroke was activated. CTH showed no indication of acute infarct. Labs grossly WNL. VSS. Patient remained symptomatic with hemiparesis and hemisensory loss.  tPA administered at 12h01. NIHSS 2; MRS 1.      SUBJECTIVE: No events overnight.  No new neurologic complaints.     [id number]    acetaminophen  IVPB .. 1000 milliGRAM(s) IV Intermittent once PRN  aspirin  chewable 81 milliGRAM(s) Oral daily  atorvastatin 80 milliGRAM(s) Oral at bedtime  enoxaparin Injectable 40 milliGRAM(s) SubCutaneous daily  escitalopram 5 milliGRAM(s) Oral daily  labetalol Injectable 10 milliGRAM(s) IV Push every 4 hours PRN  pantoprazole    Tablet 40 milliGRAM(s) Oral before breakfast      PHYSICAL EXAM:   Vital Signs Last 24 Hrs  T(C): 36.9 (10 Apr 2019 07:36), Max: 36.9 (09 Apr 2019 23:50)  T(F): 98.4 (10 Apr 2019 07:36), Max: 98.4 (09 Apr 2019 23:50)  HR: 55 (10 Apr 2019 07:36) (55 - 70)  BP: 153/68 (10 Apr 2019 07:36) (112/61 - 154/85)  BP(mean): --  RR: 18 (10 Apr 2019 07:36) (18 - 18)  SpO2: 95% (10 Apr 2019 07:36) (95% - 97%)    General: No acute distress  HEENT: EOM intact, visual fields full  Abdomen: Soft, nontender, nondistended   Extremities: No edema    NEUROLOGICAL EXAM:  Mental status: Awake, alert, oriented x person, place, time follows simple commands, reptation intact  Cranial Nerves: trace right facial droopy, no nystagmus, subtle dysarthria,  tongue midline  Motor exam: slight right hemiparesis: RUE 4/5 distally, proximally 5/5, RLE 4/5, 5/5 LUE, 5/5 LLE   Sensation: Intact to light touch   Coordination/ Gait: gait not assessed     LABS:   04-10    142  |  104  |  9   ----------------------------<  115<H>  4.0   |  24  |  0.83    Ca    9.4      10 Apr 2019 06:35      Hemoglobin A1C, Whole Blood: 5.9 % (04-08 @ 08:49)  Hemoglobin A1C, Whole Blood: 5.9 % (04-08 @ 08:49)      IMAGING: Reviewed by me.     04.07.19   CT BRAIN:  No acute intracranial hemorrhage, mass effect, vasogenic edema, or   evidence of acute territorial infarct.  Chronic left foraminal infarct and moderate white matter microvascular   ischemic disease.  Ventricles moderately dilated slightly out of proportion to the sulci,   suggesting the presence of normal pressure hydrocephalus    CTA BRAIN:  No flow-limiting stenosis.  Mild narrowing of the distal right supraclinoid left ICA. Mild narrowing   of the P2 segment of the right PCA. Mild narrowing of the distal right M1   segment.  1 to 2 mm posterolaterally projecting left proximal supraclinoid   aneurysm. The neck measures approximately 1 mm in size.    CTA NECK:  55-60% short segment stenosis by NASCET criteria of the proximal left ICA   beginning 1 cm from its origin due to calcified plaque. Normal   flow-related enhancement of the more distal left ICA.    MR Head No Cont (04.08.19)   Large ventricles consistent with moderate atrophy. Small   vessel white matter ischemic changes. A few tiny foci of diffusion   restriction in the left posterior frontal white matter consistent with   acute infarcts. THE PATIENT WAS SEEN AND EXAMINED BY ME WITH THE HOUSESTAFF AND STROKE TEAM DURING MORNING ROUNDS.   HPI:  89yo RH [place]ian woman with a PMHx of HTN, Breast CA. s/p L. mastectomy, presented with RUE/RLE paresthesia and weakness. Patient reported she woke normal. Stated that at 930am day of admission, she suddenly began experiencing unsteady gait, which she attributes to weakness of her RLE, as well as paresthesias of her RUE and RLE. Patient stated that there was mild improvement of the symptoms without resolution. EMS was activated.  At [facility], Code Stroke was activated. CTH showed no indication of acute infarct. Labs grossly WNL. VSS. Patient remained symptomatic with hemiparesis and hemisensory loss.  tPA administered at 12h01. NIHSS 2; MRS 1.      SUBJECTIVE: No events overnight.  No new neurologic complaints.     [id number]    acetaminophen  IVPB .. 1000 milliGRAM(s) IV Intermittent once PRN  aspirin  chewable 81 milliGRAM(s) Oral daily  atorvastatin 80 milliGRAM(s) Oral at bedtime  enoxaparin Injectable 40 milliGRAM(s) SubCutaneous daily  escitalopram 5 milliGRAM(s) Oral daily  labetalol Injectable 10 milliGRAM(s) IV Push every 4 hours PRN  pantoprazole    Tablet 40 milliGRAM(s) Oral before breakfast      PHYSICAL EXAM:   Vital Signs Last 24 Hrs  T(C): 36.9 (10 Apr 2019 07:36), Max: 36.9 (09 Apr 2019 23:50)  T(F): 98.4 (10 Apr 2019 07:36), Max: 98.4 (09 Apr 2019 23:50)  HR: 55 (10 Apr 2019 07:36) (55 - 70)  BP: 153/68 (10 Apr 2019 07:36) (112/61 - 154/85)  BP(mean): --  RR: 18 (10 Apr 2019 07:36) (18 - 18)  SpO2: 95% (10 Apr 2019 07:36) (95% - 97%)    General: No acute distress  HEENT: EOM intact, visual fields full  Abdomen: Soft, nontender, nondistended   Extremities: No edema    NEUROLOGICAL EXAM:  Mental status: Awake, alert, oriented x person, place, time follows simple commands, repetition intact  Cranial Nerves: trace right facial droopy, no nystagmus, subtle dysarthria,  tongue midline  Motor exam: slight right hemiparesis: RUE 4/5 distally, proximally 5/5, RLE 4/5, 5/5 LUE, 5/5 LLE   Sensation: Intact to light touch   Coordination/ Gait: gait not assessed     LABS:   04-10    142  |  104  |  9   ----------------------------<  115<H>  4.0   |  24  |  0.83    Ca    9.4      10 Apr 2019 06:35      Hemoglobin A1C, Whole Blood: 5.9 % (04-08 @ 08:49)  Hemoglobin A1C, Whole Blood: 5.9 % (04-08 @ 08:49)      IMAGING: Reviewed by me.     04.07.19   CT BRAIN:  No acute intracranial hemorrhage, mass effect, vasogenic edema, or   evidence of acute territorial infarct.  Chronic left foraminal infarct and moderate white matter microvascular   ischemic disease.  Ventricles moderately dilated slightly out of proportion to the sulci,   suggesting the presence of normal pressure hydrocephalus    CTA BRAIN:  No flow-limiting stenosis.  Mild narrowing of the distal right supraclinoid left ICA. Mild narrowing   of the P2 segment of the right PCA. Mild narrowing of the distal right M1   segment.  1 to 2 mm posterolaterally projecting left proximal supraclinoid   aneurysm. The neck measures approximately 1 mm in size.    CTA NECK:  55-60% short segment stenosis by NASCET criteria of the proximal left ICA   beginning 1 cm from its origin due to calcified plaque. Normal   flow-related enhancement of the more distal left ICA.    MR Head No Cont (04.08.19)   Large ventricles consistent with moderate atrophy. Small   vessel white matter ischemic changes. A few tiny foci of diffusion   restriction in the left posterior frontal white matter consistent with   acute infarcts.

## 2019-04-10 NOTE — CONSULT NOTE ADULT - SUBJECTIVE AND OBJECTIVE BOX
CHIEF COMPLAINT:Patient is a 88y old  Female who presents with a chief complaint of R sided weakness (07 Apr 2019 16:00)      HISTORY OF PRESENT ILLNESS:    88 female with history of HTN , Breast CA, s/p mastectomy presented with RUE/RLE paresthesia and weakness  At [facility], Code Stroke was activated. CTH showed no indication of acute infarct. Labs grossly WNL. VSS. Patient remained symptomatic with hemiparesis and hemisensory loss.  tPA administered  as per neuro pt with Left posterior frontal distribution infarcts- likely etiology being large vessel disease due to   left carotid disease planned for CEA vs STENT  denies any chest pain, sob, palpitation, dizziness or syncope.     PAST MEDICAL & SURGICAL HISTORY:  Dementia  Breast cancer  HTN (hypertension)  Hypercholesterolemia  Hypertension  H/O mastectomy, left          MEDICATIONS:  aspirin  chewable 81 milliGRAM(s) Oral daily  enoxaparin Injectable 40 milliGRAM(s) SubCutaneous daily  labetalol Injectable 10 milliGRAM(s) IV Push every 4 hours PRN        acetaminophen  IVPB .. 1000 milliGRAM(s) IV Intermittent once PRN  escitalopram 5 milliGRAM(s) Oral daily    pantoprazole    Tablet 40 milliGRAM(s) Oral before breakfast    atorvastatin 80 milliGRAM(s) Oral at bedtime        FAMILY HISTORY:      Non-contributory    SOCIAL HISTORY:    No tobacco, drugs or etoh    Allergies    penicillins (Unknown)    Intolerances    	    REVIEW OF SYSTEMS:  as above  The rest of the 14 points ROS reviewed and except above they are unremarkable.        PHYSICAL EXAM:  T(C): 36.5 (04-10-19 @ 15:43), Max: 36.9 (04-09-19 @ 23:50)  HR: 65 (04-10-19 @ 15:43) (55 - 69)  BP: 149/74 (04-10-19 @ 15:43) (112/61 - 153/68)  RR: 18 (04-10-19 @ 15:43) (18 - 18)  SpO2: 97% (04-10-19 @ 15:43) (95% - 97%)  Wt(kg): --  I&O's Summary    09 Apr 2019 07:01  -  10 Apr 2019 07:00  --------------------------------------------------------  IN: 660 mL / OUT: 0 mL / NET: 660 mL    10 Apr 2019 07:01  -  10 Apr 2019 15:57  --------------------------------------------------------  IN: 480 mL / OUT: 0 mL / NET: 480 mL      JVP: Normal  Neck: supple  Lung: clear   CV: S1 S2 , Murmur:  Abd: soft  Ext: No edema  neuro: Awake / alert  Psych: flat affect  Skin: normal      LABS/DATA:    TELEMETRY: 	    ECG:  	   	  CARDIAC MARKERS:                                      14.5   5.24  )-----------( 151      ( 10 Apr 2019 10:05 )             44.9     04-10    142  |  104  |  9   ----------------------------<  115<H>  4.0   |  24  |  0.83    Ca    9.4      10 Apr 2019 06:35      proBNP:   Lipid Profile:   HgA1c:   TSH:     < from: Transthoracic Echocardiogram (04.09.19 @ 14:42) >  Patient name: XENIA TOBAR  YOB: 1931   Age: 88 (F)   MR#: [id number]  Study Date: 4/9/2019  [facility] Ki[facility] Baptiste RDCS  Study quality: Technically good  Referring Physician: Lb Marks MD  Blood Pressure: 143/77 mmHg  Height: 152 cm  Weight: 73 kg  BSA: 1.7 m2  ------------------------------------------------------------------------  PROCEDURE: Transthoracic echocardiogram with 2-D, M-Mode  and complete spectral and color flow Doppler.  INDICATION: Cerebral infarction, unspecified (I63.9)  ------------------------------------------------------------------------  Dimensions:    Normal Values:  LA:     3.1    2.0 - 4.0 cm  Ao:     3.0    2.0 - 3.8 cm  SEPTUM: 0.8    0.6 - 1.2 cm  PWT:    1.0    0.6- 1.1 cm  LVIDd:  4.1    3.0 - 5.6 cm  LVIDs:  2.6    1.8 - 4.0 cm  Derived variables:  LVMI: 67 g/m2  RWT: 0.48  Fractional short: 37 %  EF (Visual Estimate): 70 %  ------------------------------------------------------------------------  Observations:  Mitral Valve: Mitral annular calcification, otherwise  normal mitral valve.  Aortic Valve/Aorta: Calcified trileaflet aortic valve with  normal opening. Mild aortic regurgitation.  Normal aortic root size. (Ao: 3.0 cm at the sinuses of  Valsalva).  Aortic atherosclerosis.  Left Atrium: Normal left atrium.  LA volume index = 12  cc/m2.  Left Ventricle: Normal left ventricular systolic function.  No segmental wall motion abnormalities. Normal left  ventricular internal dimensions and wall thicknesses.  Impaired LV relaxation with normal filling pressures.  Right Heart: Normal right atrium. Normal right ventricular  size and function. Normal tricuspid valve. Minimal  tricuspid regurgitation. Normal pulmonic valve. Minimal  pulmonic regurgitation.  Pericardium/Pleura: Normal pericardium with no pericardial  effusion.  Hemodynamic: No evidence of pulmonary hypertension.  No PFO  seen with color Doppler.  ------------------------------------------------------------------------  Conclusions:  Normal left ventricular systolic function. No segmental  wall motion abnormalities.  Aortic atherosclerosis.  *** No previous Echo exam.    < end of copied text >    EKG  NORMAL SINUS RHYTHM  INCOMPLETE RIGHT BUNDLE BRANCH BLOCK  LEFT ANTERIOR FASCICULAR BLOCK  LEFT VENTRICULAR HYPERTROPHY WITH REPOLARIZATION ABNORMALITY  ABNORMAL ECG

## 2019-04-10 NOTE — PROGRESS NOTE ADULT - SUBJECTIVE AND OBJECTIVE BOX
Vascular Surgery Team Daily Progress Note    SUBJECTIVE: Patient seen and examined during the morning round, no over night event, no acute complaint.     OBJECTIVE:   Vital Signs Last 24 Hrs  T(C): 36.9 (10 Apr 2019 07:36), Max: 36.9 (09 Apr 2019 23:50)  T(F): 98.4 (10 Apr 2019 07:36), Max: 98.4 (09 Apr 2019 23:50)  HR: 55 (10 Apr 2019 07:36) (55 - 70)  BP: 153/68 (10 Apr 2019 07:36) (112/61 - 154/85)  BP(mean): --  RR: 18 (10 Apr 2019 07:36) (18 - 18)  SpO2: 95% (10 Apr 2019 07:36) (95% - 97%)    PHYSICAL EXAM:  Constitutional: resting in bed with no acute distress  Respiratory:  unlabored breathing  Neuro: CNs grossly intact, motor/sensory intact and equal bilateral in both upper and lower extremities    RADIOLOGY & ADDITIONAL STUDIES:  EXAM:  CT BRAIN STROKE PROTOCOL                        EXAM:  CT ANGIO NECK (W)AW IC                        EXAM:  CT ANGIO BRAIN (W)AW IC                          CT BRAIN:    No acute intracranial hemorrhage, mass effect, vasogenic edema, or   evidence of acute territorial infarct.    Chronic left foraminal infarct and moderate white matter microvascular   ischemic disease.    Ventricles moderately dilated slightly out of proportion to the sulci,   suggesting the presence of normal pressure hydrocephalus    CTA BRAIN:    No flow-limiting stenosis.    Mild narrowing of the distal right supraclinoid left ICA. Mild narrowing   of the P2 segment of the right PCA. Mild narrowing of the distal right M1   segment.    1 to 2 mm posterolaterally projecting left proximal supraclinoid   aneurysm. The neck measures approximately 1 mm in size.    CTA NECK:    55-60% short segment stenosis by NASCET criteria of the proximal left ICA   beginning 1 cm from its origin due to calcified plaque. Normal   flow-related enhancement of the more distal left ICA.

## 2019-04-11 VITALS
DIASTOLIC BLOOD PRESSURE: 74 MMHG | SYSTOLIC BLOOD PRESSURE: 148 MMHG | HEART RATE: 68 BPM | OXYGEN SATURATION: 96 % | RESPIRATION RATE: 18 BRPM | TEMPERATURE: 98 F

## 2019-04-11 PROBLEM — I10 ESSENTIAL (PRIMARY) HYPERTENSION: Chronic | Status: ACTIVE | Noted: 2019-04-07

## 2019-04-11 PROBLEM — F03.90 UNSPECIFIED DEMENTIA WITHOUT BEHAVIORAL DISTURBANCE: Chronic | Status: ACTIVE | Noted: 2019-04-07

## 2019-04-11 PROBLEM — Z00.00 ENCOUNTER FOR PREVENTIVE HEALTH EXAMINATION: Status: ACTIVE | Noted: 2019-04-11

## 2019-04-11 PROBLEM — C50.919 MALIGNANT NEOPLASM OF UNSPECIFIED SITE OF UNSPECIFIED FEMALE BREAST: Chronic | Status: ACTIVE | Noted: 2019-04-07

## 2019-04-11 LAB
ANION GAP SERPL CALC-SCNC: 14 MMOL/L — SIGNIFICANT CHANGE UP (ref 5–17)
BUN SERPL-MCNC: 11 MG/DL — SIGNIFICANT CHANGE UP (ref 7–23)
CALCIUM SERPL-MCNC: 10.1 MG/DL — SIGNIFICANT CHANGE UP (ref 8.4–10.5)
CHLORIDE SERPL-SCNC: 103 MMOL/L — SIGNIFICANT CHANGE UP (ref 96–108)
CO2 SERPL-SCNC: 25 MMOL/L — SIGNIFICANT CHANGE UP (ref 22–31)
CREAT SERPL-MCNC: 1.03 MG/DL — SIGNIFICANT CHANGE UP (ref 0.5–1.3)
GLUCOSE SERPL-MCNC: 166 MG/DL — HIGH (ref 70–99)
HCT VFR BLD CALC: 45.6 % — HIGH (ref 34.5–45)
HGB BLD-MCNC: 15.3 G/DL — SIGNIFICANT CHANGE UP (ref 11.5–15.5)
MAGNESIUM SERPL-MCNC: 2.2 MG/DL — SIGNIFICANT CHANGE UP (ref 1.6–2.6)
MCHC RBC-ENTMCNC: 29.5 PG — SIGNIFICANT CHANGE UP (ref 27–34)
MCHC RBC-ENTMCNC: 33.5 GM/DL — SIGNIFICANT CHANGE UP (ref 32–36)
MCV RBC AUTO: 88 FL — SIGNIFICANT CHANGE UP (ref 80–100)
PHOSPHATE SERPL-MCNC: 3.7 MG/DL — SIGNIFICANT CHANGE UP (ref 2.5–4.5)
PLATELET # BLD AUTO: 170 K/UL — SIGNIFICANT CHANGE UP (ref 150–400)
POTASSIUM SERPL-MCNC: 4.1 MMOL/L — SIGNIFICANT CHANGE UP (ref 3.5–5.3)
POTASSIUM SERPL-SCNC: 4.1 MMOL/L — SIGNIFICANT CHANGE UP (ref 3.5–5.3)
RBC # BLD: 5.18 M/UL — SIGNIFICANT CHANGE UP (ref 3.8–5.2)
RBC # FLD: 13.1 % — SIGNIFICANT CHANGE UP (ref 10.3–14.5)
SODIUM SERPL-SCNC: 142 MMOL/L — SIGNIFICANT CHANGE UP (ref 135–145)
WBC # BLD: 6 K/UL — SIGNIFICANT CHANGE UP (ref 3.8–10.5)
WBC # FLD AUTO: 6 K/UL — SIGNIFICANT CHANGE UP (ref 3.8–10.5)

## 2019-04-11 PROCEDURE — 85027 COMPLETE CBC AUTOMATED: CPT

## 2019-04-11 PROCEDURE — 81225 CYP2C19 GENE COM VARIANTS: CPT

## 2019-04-11 PROCEDURE — 99285 EMERGENCY DEPT VISIT HI MDM: CPT

## 2019-04-11 PROCEDURE — 93880 EXTRACRANIAL BILAT STUDY: CPT

## 2019-04-11 PROCEDURE — 83036 HEMOGLOBIN GLYCOSYLATED A1C: CPT

## 2019-04-11 PROCEDURE — 70551 MRI BRAIN STEM W/O DYE: CPT

## 2019-04-11 PROCEDURE — 93306 TTE W/DOPPLER COMPLETE: CPT

## 2019-04-11 PROCEDURE — 84100 ASSAY OF PHOSPHORUS: CPT

## 2019-04-11 PROCEDURE — 93005 ELECTROCARDIOGRAM TRACING: CPT

## 2019-04-11 PROCEDURE — 70498 CT ANGIOGRAPHY NECK: CPT

## 2019-04-11 PROCEDURE — 82962 GLUCOSE BLOOD TEST: CPT

## 2019-04-11 PROCEDURE — 85610 PROTHROMBIN TIME: CPT

## 2019-04-11 PROCEDURE — 85730 THROMBOPLASTIN TIME PARTIAL: CPT

## 2019-04-11 PROCEDURE — 81003 URINALYSIS AUTO W/O SCOPE: CPT

## 2019-04-11 PROCEDURE — 97166 OT EVAL MOD COMPLEX 45 MIN: CPT

## 2019-04-11 PROCEDURE — 80048 BASIC METABOLIC PNL TOTAL CA: CPT

## 2019-04-11 PROCEDURE — 83735 ASSAY OF MAGNESIUM: CPT

## 2019-04-11 PROCEDURE — 80053 COMPREHEN METABOLIC PANEL: CPT

## 2019-04-11 PROCEDURE — 70450 CT HEAD/BRAIN W/O DYE: CPT

## 2019-04-11 PROCEDURE — 97162 PT EVAL MOD COMPLEX 30 MIN: CPT

## 2019-04-11 PROCEDURE — 80061 LIPID PANEL: CPT

## 2019-04-11 PROCEDURE — 70496 CT ANGIOGRAPHY HEAD: CPT

## 2019-04-11 PROCEDURE — 93970 EXTREMITY STUDY: CPT

## 2019-04-11 RX ORDER — CLOPIDOGREL BISULFATE 75 MG/1
300 TABLET, FILM COATED ORAL ONCE
Qty: 0 | Refills: 0 | Status: COMPLETED | OUTPATIENT
Start: 2019-04-11 | End: 2019-04-11

## 2019-04-11 RX ORDER — CLOPIDOGREL BISULFATE 75 MG/1
1 TABLET, FILM COATED ORAL
Qty: 30 | Refills: 0 | OUTPATIENT
Start: 2019-04-11 | End: 2019-05-10

## 2019-04-11 RX ORDER — CLOPIDOGREL BISULFATE 75 MG/1
75 TABLET, FILM COATED ORAL DAILY
Qty: 0 | Refills: 0 | Status: DISCONTINUED | OUTPATIENT
Start: 2019-04-11 | End: 2019-04-11

## 2019-04-11 RX ORDER — CLOPIDOGREL BISULFATE 75 MG/1
75 TABLET, FILM COATED ORAL DAILY
Qty: 0 | Refills: 0 | Status: DISCONTINUED | OUTPATIENT
Start: 2019-04-12 | End: 2019-04-11

## 2019-04-11 RX ADMIN — CLOPIDOGREL BISULFATE 300 MILLIGRAM(S): 75 TABLET, FILM COATED ORAL at 11:54

## 2019-04-11 RX ADMIN — Medication 81 MILLIGRAM(S): at 11:54

## 2019-04-11 RX ADMIN — ESCITALOPRAM OXALATE 5 MILLIGRAM(S): 10 TABLET, FILM COATED ORAL at 11:54

## 2019-04-11 RX ADMIN — PANTOPRAZOLE SODIUM 40 MILLIGRAM(S): 20 TABLET, DELAYED RELEASE ORAL at 06:09

## 2019-04-11 RX ADMIN — ENOXAPARIN SODIUM 40 MILLIGRAM(S): 100 INJECTION SUBCUTANEOUS at 11:54

## 2019-04-11 NOTE — CHART NOTE - NSCHARTNOTEFT_GEN_A_CORE
Vascular Surgery    Patient to get [id number] Sequence Genotype test before discharge today. Spoke with clinical pharmacist, core lab and [facility] lab to place order.  Per core lab, the patient needs to get one lab draw in lavender tube and have it sent to lab with downtime form labelling it as "Misc Lab: [id number] Sequence Genotype"  After lab sent, patient will be discharged home. Plan discussed with RN.    [id number]

## 2019-04-11 NOTE — PROGRESS NOTE ADULT - SUBJECTIVE AND OBJECTIVE BOX
THE PATIENT WAS SEEN AND EXAMINED BY ME WITH THE HOUSESTAFF AND STROKE TEAM DURING MORNING ROUNDS.   HPI:  87yo RH [place] woman with a PMHx of HTN, Breast CA. s/p L. mastectomy, presented with RUE/RLE paresthesia and weakness. Patient reported she woke normal. Stated that at 930am day of admission, she suddenly began experiencing unsteady gait, which she attributes to weakness of her RLE, as well as paresthesias of her RUE and RLE. Patient stated that there was mild improvement of the symptoms without resolution. EMS was activated.  At [facility], Code Stroke was activated. CTH showed no indication of acute infarct. Labs grossly WNL. VSS. Patient remained symptomatic with hemiparesis and hemisensory loss.  tPA administered at 12h01. NIHSS 2; MRS 1.      SUBJECTIVE: No events overnight.  No new neurologic complaints.      Translation requested to be performed by RADHA Muniz per patient.     acetaminophen  IVPB .. 1000 milliGRAM(s) IV Intermittent once PRN  aspirin  chewable 81 milliGRAM(s) Oral daily  atorvastatin 80 milliGRAM(s) Oral at bedtime  clopidogrel Tablet 300 milliGRAM(s) Oral once  enoxaparin Injectable 40 milliGRAM(s) SubCutaneous daily  escitalopram 5 milliGRAM(s) Oral daily  labetalol Injectable 10 milliGRAM(s) IV Push every 4 hours PRN  pantoprazole    Tablet 40 milliGRAM(s) Oral before breakfast    PHYSICAL EXAM:   Vital Signs Last 24 Hrs  T(C): 36.7 (11 Apr 2019 08:07), Max: 36.7 (11 Apr 2019 04:31)  T(F): 98 (11 Apr 2019 08:07), Max: 98.1 (11 Apr 2019 04:31)  HR: 63 (11 Apr 2019 08:07) (61 - 65)  BP: 127/76 (11 Apr 2019 08:07) (127/76 - 151/71)  RR: 18 (11 Apr 2019 08:07) (18 - 18)  SpO2: 98% (11 Apr 2019 08:07) (96% - 98%)    General: No acute distress  HEENT: EOM intact, visual fields full  Abdomen: Soft, nontender, nondistended   Extremities: No edema    NEUROLOGICAL EXAM:  Mental status: Awake, alert, oriented x person, place, time follows simple commands, repetition intact  Cranial Nerves: trace right nasolabial flattening, no nystagmus, no dysarthria, tongue midline  Motor exam: slight right hemiparesis: RUE 5-/5 distally, proximally 5/5, RLE 4/5, 5/5 LUE, 5/5 LLE   Sensation: Intact to light touch   Coordination/ Gait: gait not assessed    LABS:                        15.3   6.0   )-----------( 170      ( 11 Apr 2019 10:19 )             45.6    04-10    142  |  104  |  9   ----------------------------<  115<H>  4.0   |  24  |  0.83    Ca    9.4      10 Apr 2019 06:35    Hemoglobin A1C, Whole Blood: 5.9 % (04-08 @ 08:49)  Hemoglobin A1C, Whole Blood: 5.9 % (04-08 @ 08:49)    IMAGING: Reviewed by me.     04.07.19   CT BRAIN:  No acute intracranial hemorrhage, mass effect, vasogenic edema, or   evidence of acute territorial infarct.  Chronic left foraminal infarct and moderate white matter microvascular   ischemic disease.  Ventricles moderately dilated slightly out of proportion to the sulci,   suggesting the presence of normal pressure hydrocephalus    CTA BRAIN:  No flow-limiting stenosis.  Mild narrowing of the distal right supraclinoid left ICA. Mild narrowing   of the P2 segment of the right PCA. Mild narrowing of the distal right M1   segment.  1 to 2 mm posterolaterally projecting left proximal supraclinoid   aneurysm. The neck measures approximately 1 mm in size.    CTA NECK:  55-60% short segment stenosis by NASCET criteria of the proximal left ICA   beginning 1 cm from its origin due to calcified plaque. Normal   flow-related enhancement of the more distal left ICA.    MR Head No Cont (04.08.19)   Large ventricles consistent with moderate atrophy. Small   vessel white matter ischemic changes. A few tiny foci of diffusion   restriction in the left posterior frontal white matter consistent with   acute infarcts.

## 2019-04-11 NOTE — PROGRESS NOTE ADULT - ASSESSMENT
88 year old woman with vascular risk factors of age and HTN is evaluated at [facility] for right-sided weakness. She was reported to be last known well at around 9:30 AM 4/7/19 when she was brushing her teeth. Soon after that she noted right-sided weakness and sensory paresthesia, prompting her presentation to [facility]. CT brain on admission did not show any evidence of acute infarct or hemorrhage. MRI demonstrated left hemispheric infarcts. CTA head and neck  showed left supraclinoid ICA stenosis and moderate to severe proximal left ICA stenosis.    Impression:  Cerebral embolism with cerebral infarction.  Left posterior frontal distribution infarcts- likely etiology being large vessel disease (i.e symptomatic left ICA stenosis).     NEURO: neurologically with improvement in hemiparesis since admission, permissive HTN for now in setting of carotid stenosis, on vascular surgery service for possible CEA in setting of symptomatic ICA stenosis, carotid duplex demonstrates: 50-69% left ICA stenosis with calcified plaques noted in the left ICA/bulb, mild-moderate amount of right carotid plaque, intimal thickening of left carotid arteries, - continue on high intensity statin, MR imaging as noted. Physical therapy/OT eval for home with home therapy, assist.     ANTITHROMBOTIC THERAPY: ASA      PULMONARY: protecting airway, saturating well     CARDIOVASCULAR:  TTE: ef70%, mitral annular calcification, mild AR, aortic atherosclerosis, minimal RI, cardiac monitoring with no acute events        SBP goal:  <180/105 mmHg , eventual normotension     GASTROINTESTINAL:  dysphagia screen passed, tolerating diet      Diet: regular     RENAL: BUN/Cr within range, ensure adequate hydration, good urine output      Na Goal: Greater than 135     Serna: n     HEMATOLOGY: H/H without anemia, Platelets 151, monitor for further thrombocytopenia, LE duplex negative for DVT      DVT ppx: lovenox     ID: afebrile, no leukocytosis, no si/sx of infection at this time     Other: current medical condition and plan of care d/w patient, concerns and questions addressed.    DISPOSITION: Home with home therapy and assist.       CORE MEASURES:        Admission NIHSS:2      TPA: [x] YES [] NO      LDL/HDL: 136/38     Depression Screen:  NA- unable to participate, appears with good affect and conversation, would like to go home.     Statin Therapy: y      Dysphagia Screen: [x] PASS [] FAIL     Smoking [] YES [x] NO      Afib [] YES [x] NO     Stroke Education [x] YES [] NO    Obtain screening lower extremity venous ultrasound in patients who meet 1 or more of the following criteria as patient is high risk for DVT/PE on admission:   [] History of DVT/PE  [x]Hypercoagulable states (Factor V Leiden, Cancer, OCP, etc. ) -history of breast ca   []Prolonged immobility (hemiplegia/hemiparesis/post operative or any other extended immobilization)  [] Transferred from outside facility (Rehab or Long term care)  [] Age </= to 50 88 year old woman with vascular risk factors of age and HTN is evaluated at [facility] for right-sided weakness. She was reported to be last known well at around 9:30 AM 4/7/19 when she was brushing her teeth. Soon after that she noted right-sided weakness and sensory paresthesia, prompting her presentation to [facility]. CT brain on admission did not show any evidence of acute infarct or hemorrhage. MRI demonstrated left hemispheric infarcts. CTA head and neck  showed left supraclinoid ICA stenosis and moderate to severe proximal left ICA stenosis.    Impression:  Cerebral embolism with cerebral infarction. Left posterior frontal distribution infarcts- likely etiology being large vessel disease (i.e symptomatic left ICA stenosis).     NEURO: neurologically with improvement in hemiparesis since admission, permissive HTN for now in setting of carotid stenosis, on vascular surgery service for possible CEA in setting of symptomatic ICA stenosis likely next week, carotid duplex demonstrates: 50-69% left ICA stenosis with calcified plaques noted in the left ICA/bulb, mild-moderate amount of right carotid plaque, intimal thickening of left carotid arteries, - continue on high intensity statin, MR imaging as noted. Physical therapy/OT eval for home with home therapy, assist.     ANTITHROMBOTIC THERAPY: ASA      PULMONARY: protecting airway, saturating well     CARDIOVASCULAR:  TTE: Ef 70%, mitral annular calcification, mild AR, aortic atherosclerosis, minimal MD, cardiac monitoring with no acute events        SBP goal:  <180/105 mmHg, eventual normotension     GASTROINTESTINAL:  dysphagia screen passed, tolerating diet      Diet: regular     RENAL: BUN/Cr within range, ensure adequate hydration, good urine output      Na Goal: Greater than 135     Serna: n     HEMATOLOGY: H/H without anemia, Platelets 170, LE duplex negative for DVT      DVT ppx: lovenox     ID: afebrile, no leukocytosis, no si/sx of infection at this time     Other: current medical condition and plan of care d/w patient and daughter over the phone. All concerns and questions addressed.    DISPOSITION: Home with home therapy and assist with close outpatient follow up.     CORE MEASURES:        Admission NIHSS:2      TPA: [x] YES [] NO      LDL/HDL: 136/38     Depression Screen: 0     Statin Therapy: y      Dysphagia Screen: [x] PASS [] FAIL     Smoking [] YES [x] NO      Afib [] YES [x] NO     Stroke Education [x] YES [] NO    Obtain screening lower extremity venous ultrasound in patients who meet 1 or more of the following criteria as patient is high risk for DVT/PE on admission:   [] History of DVT/PE  [x]Hypercoagulable states (Factor V Leiden, Cancer, OCP, etc. ) -history of breast ca   []Prolonged immobility (hemiplegia/hemiparesis/post operative or any other extended immobilization)  [] Transferred from outside facility (Rehab or Long term care)  [] Age </= to 50

## 2019-04-11 NOTE — CONSULT NOTE ADULT - SUBJECTIVE AND OBJECTIVE BOX
CARDIOLOGY CONSULT - Dr. Redd     CHIEF COMPLAINT: pre-op eval     HPI: 88 female known to our practice with history of HTN , Breast CA, s/p mastectomy presented with RUE/RLE paresthesia and weakness. s/p Code Stroke, CTH showed no indication of acute infarct. Labs grossly WNL. VSS. Patient remained symptomatic with hemiparesis and hemisensory loss.  tPA administered. As per neuro pt with Left posterior frontal distribution infarcts- likely etiology being large vessel disease due to   left carotid disease planned for CEA vs STENT. Pt. resting comfortably, no chest pain, sob, palpitation, dizziness or syncope.     PAST MEDICAL & SURGICAL HISTORY:  Dementia  Breast cancer  HTN (hypertension)  Hypercholesterolemia  Hypertension  H/O mastectomy, left    PREVIOUS DIAGNOSTIC TESTING:    [ ] Echocardiogram: < from: Transthoracic Echocardiogram (04.09.19 @ 14:42) >  Conclusions:  Normal left ventricular systolic function. No segmental  wall motion abnormalities.  Aortic atherosclerosis.  *** No previous Echo exam.    < end of copied text >    [ ]  Catheterization:   [ ] Stress Test:  	    MEDICATIONS:  MEDICATIONS  (STANDING):  aspirin  chewable 81 milliGRAM(s) Oral daily  atorvastatin 80 milliGRAM(s) Oral at bedtime  enoxaparin Injectable 40 milliGRAM(s) SubCutaneous daily  escitalopram 5 milliGRAM(s) Oral daily  pantoprazole    Tablet 40 milliGRAM(s) Oral before breakfast      FAMILY HISTORY:      SOCIAL HISTORY:    [x] Non-smoker  [ ] Smoker  [ ] Alcohol    Allergies    penicillins (Unknown)    Intolerances    	    REVIEW OF SYSTEMS:  CONSTITUTIONAL: No fever, weight loss, or fatigue  EYES: No eye pain, visual disturbances, or discharge  ENMT:  No difficulty hearing, tinnitus, vertigo; No sinus or throat pain  NECK: No pain or stiffness  RESPIRATORY: No cough, wheezing, chills or hemoptysis; No Shortness of Breath  CARDIOVASCULAR: No chest pain, palpitations, passing out, dizziness, or leg swelling  GASTROINTESTINAL: No abdominal or epigastric pain. No nausea, vomiting, or hematemesis; No diarrhea or constipation. No melena or hematochezia.  GENITOURINARY: No dysuria, frequency, hematuria, or incontinence  NEUROLOGICAL: No headaches, memory loss, +loss of strength, numbness, or tremors  SKIN: No itching, burning, rashes, or lesions   	    [x] All others negative, see HPI 	  [  Unable to obtain    PHYSICAL EXAM:  T(C): 36.5 (04-11-19 @ 12:00), Max: 36.7 (04-11-19 @ 04:31)  HR: 65 (04-11-19 @ 12:00) (61 - 65)  BP: 148/80 (04-11-19 @ 12:00) (127/76 - 151/71)  RR: 18 (04-11-19 @ 12:00) (18 - 18)  SpO2: 93% (04-11-19 @ 12:00) (93% - 98%)  Wt(kg): --  I&O's Summary    10 Apr 2019 07:01  -  11 Apr 2019 07:00  --------------------------------------------------------  IN: 960 mL / OUT: 0 mL / NET: 960 mL        Appearance: Normal	  Psychiatry: A & O x 3, Mood & affect appropriate  HEENT:   Normal oral mucosa, PERRL, EOMI	  Lymphatic: No lymphadenopathy  Cardiovascular: Normal S1 S2,RRR, No JVD, No murmurs  Respiratory: Lungs clear to auscultation	  Gastrointestinal:  Soft, Non-tender, + BS	  Skin: No rashes, No ecchymoses, No cyanosis	  Neurologic: Non-focal  Extremities: Normal range of motion, No clubbing, cyanosis or edema  Vascular: Peripheral pulses palpable 2+ bilaterally    TELEMETRY: 	    ECG:  	  RADIOLOGY:  OTHER: 	  	  LABS:	 	    CARDIAC MARKERS:                                  15.3   6.0   )-----------( 170      ( 11 Apr 2019 10:19 )             45.6     04-11    142  |  103  |  11  ----------------------------<  166<H>  4.1   |  25  |  1.03    Ca    10.1      11 Apr 2019 10:19  Phos  3.7     04-11  Mg     2.2     04-11        proBNP:   Lipid Profile:   HgA1c:   TSH:

## 2019-04-11 NOTE — CONSULT NOTE ADULT - ATTENDING COMMENTS
patient seen and examined.  Agree with above NP note.  patient with acute cva  s/p tpa  clinically imrpoved  ica stenosis   awaiting cea vs stent  cont antiplat per neuro   vasc f/u  d/c planning after plat testing   patient remains cardiac optimized and can proceed for either CEA/carotid stenting with acceptable cardiac risk

## 2019-04-11 NOTE — PROGRESS NOTE ADULT - ASSESSMENT
89yo RH [place] woman with a PMHx of HTN, Breast CA. s/p L. mastectomy, presents with RUE/RLE paresthesia and weakness, found to have L MCA stroke s/p tPA. Vascular surgery consulted for L ICA stenosis in the setting of L MCA stroke s/p tPA.     - Patient may be a candidate for CEA vs. carotid stent  - Carotid duplex: 50-69% left ICA stenosis. Calcified plaques noted in the left ICA/bulb. Intimal thickening of left carotid arteries.  - IM clearance: No acute medical condition identified to postpone planned surgical intervention, though patient is above average risk in view of recent CVA.  - CARDS clearance (known to Dr. Abner Eaton): no CV objection to proceed to OR for this time sensitive surgery.   - AC/Anti-plt: lovenox, ASA    Vascular Surgery  [id number]

## 2019-04-11 NOTE — PROGRESS NOTE ADULT - SUBJECTIVE AND OBJECTIVE BOX
General Surgery Progress Note    SUBJECTIVE:  The patient was seen and examined. No acute events overnight. Mentating appropriately.     OBJECTIVE:     ** VITAL SIGNS / I&O's **    Vital Signs Last 24 Hrs  T(C): 36.7 (11 Apr 2019 04:31), Max: 36.9 (10 Apr 2019 07:36)  T(F): 98.1 (11 Apr 2019 04:31), Max: 98.4 (10 Apr 2019 07:36)  HR: 61 (11 Apr 2019 04:31) (55 - 65)  BP: 151/71 (11 Apr 2019 04:31) (149/74 - 153/68)  BP(mean): --  RR: 18 (11 Apr 2019 04:31) (18 - 18)  SpO2: 97% (11 Apr 2019 04:31) (95% - 97%)      10 Apr 2019 07:01  -  11 Apr 2019 07:00  --------------------------------------------------------  IN:    Oral Fluid: 960 mL  Total IN: 960 mL    OUT:  Total OUT: 0 mL    Total NET: 960 mL          ** PHYSICAL EXAM **    -- CONSTITUTIONAL: Alert, NAD  -- PULMONARY: non-labored respirations  -- ABDOMEN: soft, non-distended  -- EXT: FROM of all extremities  -- NEURO: A&Ox3    ** LABS **                          14.5   5.24  )-----------( 151      ( 10 Apr 2019 10:05 )             44.9     10 Apr 2019 06:35    142    |  104    |  9      ----------------------------<  115    4.0     |  24     |  0.83     Ca    9.4        10 Apr 2019 06:35        CAPILLARY BLOOD GLUCOSE                    MEDICATIONS  (STANDING):  aspirin  chewable 81 milliGRAM(s) Oral daily  atorvastatin 80 milliGRAM(s) Oral at bedtime  enoxaparin Injectable 40 milliGRAM(s) SubCutaneous daily  escitalopram 5 milliGRAM(s) Oral daily  pantoprazole    Tablet 40 milliGRAM(s) Oral before breakfast    MEDICATIONS  (PRN):  acetaminophen  IVPB .. 1000 milliGRAM(s) IV Intermittent once PRN Mild Pain (1 - 3), Moderate Pain (4 - 6)  labetalol Injectable 10 milliGRAM(s) IV Push every 4 hours PRN SBP >180

## 2019-04-11 NOTE — PROGRESS NOTE ADULT - SUBJECTIVE AND OBJECTIVE BOX
Patient is a 88y old  Female who presents with a chief complaint of R sided weakness (07 Apr 2019 16:00)      SUBJECTIVE / OVERNIGHT EVENTS: No new complaints.   Review of Systems  chest pain no  palpitations no  sob no  nausea no  headache no    MEDICATIONS  (STANDING):  aspirin  chewable 81 milliGRAM(s) Oral daily  atorvastatin 80 milliGRAM(s) Oral at bedtime  enoxaparin Injectable 40 milliGRAM(s) SubCutaneous daily  escitalopram 5 milliGRAM(s) Oral daily  pantoprazole    Tablet 40 milliGRAM(s) Oral before breakfast    MEDICATIONS  (PRN):  acetaminophen  IVPB .. 1000 milliGRAM(s) IV Intermittent once PRN Mild Pain (1 - 3), Moderate Pain (4 - 6)  labetalol Injectable 10 milliGRAM(s) IV Push every 4 hours PRN SBP >180      Vital Signs Last 24 Hrs  T(C): 36.5 (11 Apr 2019 12:00), Max: 36.7 (11 Apr 2019 04:31)  T(F): 97.7 (11 Apr 2019 12:00), Max: 98.1 (11 Apr 2019 04:31)  HR: 65 (11 Apr 2019 12:00) (61 - 65)  BP: 148/80 (11 Apr 2019 12:00) (127/76 - 151/71)  BP(mean): --  RR: 18 (11 Apr 2019 12:00) (18 - 18)  SpO2: 93% (11 Apr 2019 12:00) (93% - 98%)    PHYSICAL EXAM:  GENERAL: NAD, well-developed  HEAD:  Atraumatic, Normocephalic  EYES: EOMI, PERRLA, conjunctiva and sclera clear  NECK: Supple, No JVD  CHEST/LUNG: Clear to auscultation bilaterally; No wheeze  HEART: Regular rate and rhythm; No murmurs, rubs, or gallops  ABDOMEN: Soft, Nontender, Nondistended; Bowel sounds present  EXTREMITIES:  2+ Peripheral Pulses, No clubbing, cyanosis, or edema  PSYCH: confused pleasantly  NEUROLOGY: R sided weakness  SKIN: No rashes or lesions    LABS:                        15.3   6.0   )-----------( 170      ( 11 Apr 2019 10:19 )             45.6     04-11    142  |  103  |  11  ----------------------------<  166<H>  4.1   |  25  |  1.03    Ca    10.1      11 Apr 2019 10:19  Phos  3.7     04-11  Mg     2.2     04-11                  RADIOLOGY & ADDITIONAL TESTS:    Imaging Personally Reviewed:    Consultant(s) Notes Reviewed:      Care Discussed with Consultants/Other Providers:

## 2019-04-11 NOTE — PROGRESS NOTE ADULT - ASSESSMENT
88 f with  CVA  - s/p TPA  - Echo pending     HTN  - control    Carotid stenosis  - vascular follow re CEA vs stent.    Dementia  - supportive care    No acute medical condition identified to postpone planned surgical intervention, though patient is above average risk in view of recent CVA.  d/w daughter QA  Mau Mc MD pager [phone]

## 2019-04-11 NOTE — CONSULT NOTE ADULT - ASSESSMENT
88 female known to our practice with history of HTN , Breast CA, s/p mastectomy presenting with acute CVA, carotid disease.    1. Acute CVA   likely 2/2 to carotid disease   CT brain on admission did not show any evidence of acute infarct or hemorrhage.   MRI demonstrated left hemispheric infarcts.  CTA head and neck  showed left supraclinoid ICA stenosis and moderate to severe proximal left ICA stenosis.  s/p TPA, symptoms improved   echo with normal LV function, aortic atherosclerosis   neuro f/u   continue asa, statin     2. Carotid Stenosis   Carotid duplex: 50-69% left ICA stenosis. Calcified plaques noted in the left ICA/bulb. Intimal thickening of left carotid arteries.  plan for  CEA vs. carotid stent   cv stable, no cp/sob, pt. cardiac optimized and can proceed for planned procedure with moderate but acceptable cv risk.   continue asa, statin   vascular f/u     3. HTN  stable, bp management per neuro     dvt ppx

## 2019-04-17 ENCOUNTER — OUTPATIENT (OUTPATIENT)
Dept: OUTPATIENT SERVICES | Facility: HOSPITAL | Age: 84
LOS: 1 days | End: 2019-04-17
Payer: MEDICARE

## 2019-04-17 VITALS
HEIGHT: 59 IN | DIASTOLIC BLOOD PRESSURE: 80 MMHG | SYSTOLIC BLOOD PRESSURE: 120 MMHG | OXYGEN SATURATION: 98 % | TEMPERATURE: 98 F | RESPIRATION RATE: 14 BRPM | HEART RATE: 74 BPM | WEIGHT: 143.96 LBS

## 2019-04-17 DIAGNOSIS — R06.83 SNORING: ICD-10-CM

## 2019-04-17 DIAGNOSIS — Z98.49 CATARACT EXTRACTION STATUS, UNSPECIFIED EYE: Chronic | ICD-10-CM

## 2019-04-17 DIAGNOSIS — I65.29 OCCLUSION AND STENOSIS OF UNSPECIFIED CAROTID ARTERY: ICD-10-CM

## 2019-04-17 DIAGNOSIS — Z90.12 ACQUIRED ABSENCE OF LEFT BREAST AND NIPPLE: Chronic | ICD-10-CM

## 2019-04-17 DIAGNOSIS — I10 ESSENTIAL (PRIMARY) HYPERTENSION: ICD-10-CM

## 2019-04-17 LAB
ALBUMIN SERPL ELPH-MCNC: 4.5 G/DL — SIGNIFICANT CHANGE UP (ref 3.3–5)
ALP SERPL-CCNC: 67 U/L — SIGNIFICANT CHANGE UP (ref 40–120)
ALT FLD-CCNC: 20 U/L — SIGNIFICANT CHANGE UP (ref 4–33)
ANION GAP SERPL CALC-SCNC: 14 MMO/L — SIGNIFICANT CHANGE UP (ref 7–14)
AST SERPL-CCNC: 21 U/L — SIGNIFICANT CHANGE UP (ref 4–32)
BILIRUB SERPL-MCNC: 0.9 MG/DL — SIGNIFICANT CHANGE UP (ref 0.2–1.2)
BLD GP AB SCN SERPL QL: NEGATIVE — SIGNIFICANT CHANGE UP
BUN SERPL-MCNC: 11 MG/DL — SIGNIFICANT CHANGE UP (ref 7–23)
CALCIUM SERPL-MCNC: 10.4 MG/DL — SIGNIFICANT CHANGE UP (ref 8.4–10.5)
CHLORIDE SERPL-SCNC: 101 MMOL/L — SIGNIFICANT CHANGE UP (ref 98–107)
CO2 SERPL-SCNC: 26 MMOL/L — SIGNIFICANT CHANGE UP (ref 22–31)
CREAT SERPL-MCNC: 0.99 MG/DL — SIGNIFICANT CHANGE UP (ref 0.5–1.3)
GLUCOSE SERPL-MCNC: 118 MG/DL — HIGH (ref 70–99)
HCT VFR BLD CALC: 47.6 % — HIGH (ref 34.5–45)
HGB BLD-MCNC: 14.6 G/DL — SIGNIFICANT CHANGE UP (ref 11.5–15.5)
MCHC RBC-ENTMCNC: 27.5 PG — SIGNIFICANT CHANGE UP (ref 27–34)
MCHC RBC-ENTMCNC: 30.7 % — LOW (ref 32–36)
MCV RBC AUTO: 89.6 FL — SIGNIFICANT CHANGE UP (ref 80–100)
NRBC # FLD: 0 K/UL — SIGNIFICANT CHANGE UP (ref 0–0)
PLATELET # BLD AUTO: 191 K/UL — SIGNIFICANT CHANGE UP (ref 150–400)
PMV BLD: 11.1 FL — SIGNIFICANT CHANGE UP (ref 7–13)
POTASSIUM SERPL-MCNC: 3.7 MMOL/L — SIGNIFICANT CHANGE UP (ref 3.5–5.3)
POTASSIUM SERPL-SCNC: 3.7 MMOL/L — SIGNIFICANT CHANGE UP (ref 3.5–5.3)
PROT SERPL-MCNC: 7.3 G/DL — SIGNIFICANT CHANGE UP (ref 6–8.3)
RBC # BLD: 5.31 M/UL — HIGH (ref 3.8–5.2)
RBC # FLD: 13.7 % — SIGNIFICANT CHANGE UP (ref 10.3–14.5)
RH IG SCN BLD-IMP: POSITIVE — SIGNIFICANT CHANGE UP
SODIUM SERPL-SCNC: 141 MMOL/L — SIGNIFICANT CHANGE UP (ref 135–145)
WBC # BLD: 6.28 K/UL — SIGNIFICANT CHANGE UP (ref 3.8–10.5)
WBC # FLD AUTO: 6.28 K/UL — SIGNIFICANT CHANGE UP (ref 3.8–10.5)

## 2019-04-17 PROCEDURE — 93010 ELECTROCARDIOGRAM REPORT: CPT

## 2019-04-17 RX ORDER — PANTOPRAZOLE SODIUM 20 MG/1
1 TABLET, DELAYED RELEASE ORAL
Qty: 0 | Refills: 0 | COMMUNITY

## 2019-04-17 RX ORDER — LOSARTAN POTASSIUM 100 MG/1
1 TABLET, FILM COATED ORAL
Qty: 0 | Refills: 0 | COMMUNITY

## 2019-04-17 RX ORDER — CHOLECALCIFEROL (VITAMIN D3) 125 MCG
0 CAPSULE ORAL
Qty: 0 | Refills: 0 | COMMUNITY

## 2019-04-17 NOTE — H&P PST ADULT - HISTORY OF PRESENT ILLNESS
Patient is a 88 year old female  French speaking with a PMHx of HTN, Breast CA. s/p L. mastectomy, presents with RUE/RLE paresthesia and weakness. Patient reports she woke normal. States that at 930am, he suddenly began experiencing unsteady gait, which she attributes to weakness of her RLE, as well as paresthesias of her RUE and RLE. Patient states that there was mild improvement of the symptoms without resolution. EMS was activated.   At [facility], Code Stroke was activated. CTH showed no indication of acute infarct. Labs grossly WNL. VSS. Patient remained symptomatic with hemiparesis and hemisensory loss.   Patient currently denies fevers, chills, ns, cp, sob, abd pain, n/v/d/c.     Benefits and risks of tPA were discussed with the patient, translated through A, as well as with daughter, fluent in english, over the phone.   All contraindications were reviewed with patient and patients daughter separately.   Both patient and patients daughter were agreeable to tPA administration.   tPA administered at 12h01.     NIHSS 2; MRS 1. Patient is a 88 year old female  Algerian speaking with a PMHx of HTN, Breast CA. s/p L. mastectomy, with a recent hospitalization to [facility] on April 7th 2019 who presented with RUE/RLE paresthesia and weakness, s/p code stroke, tPA was administered. As per neurology patient with left posterior frontal distributions infarcts, likely etiology being large vessel disease due to left carotid disease . Patient was referred to Dr. Mora  for an evaluation. Patient presents to [facility] for an evaluation for a scheduled Left carotid stent with EEG monitoring scheduled on 4/30/2019.  Pre op diagnosis: Occlusion and stenosis of unspecified carotid artery.       Patient is a poor historian. Patient is Algerian speaking -  Services was offered multiple times and was refused by Patient. Patient [facility] [phone] to translate.

## 2019-04-17 NOTE — H&P PST ADULT - NEGATIVE ENMT SYMPTOMS
no vertigo/no sinus symptoms/no tinnitus/no ear pain/no throat pain/no dysphagia/no hearing difficulty/no nasal congestion

## 2019-04-17 NOTE — H&P PST ADULT - VENOUS THROMBOEMBOLISM CURRENT STATUS
(1) other risk factor (includes escalating BMI, pack-years of smoking, diabetes requiring insulin, chemotherapy, female gender and length of surgery)/(2) malignancy (present or previous)/(5) stroke (within 1 month)

## 2019-04-17 NOTE — H&P PST ADULT - NEGATIVE OPHTHALMOLOGIC SYMPTOMS
no discharge L/no blurred vision L/no blurred vision R/no discharge R/no irritation L/no pain L/no pain R/no irritation R

## 2019-04-17 NOTE — H&P PST ADULT - RS GEN PE MLT RESP DETAILS PC
good air movement/no rales/airway patent/breath sounds equal/respirations non-labored/clear to auscultation bilaterally/no rhonchi/no wheezes

## 2019-04-17 NOTE — H&P PST ADULT - MUSCULOSKELETAL
details… detailed exam no joint warmth/no calf tenderness/diminished strength/no joint swelling/no joint erythema/decreased ROM/RLE

## 2019-04-17 NOTE — H&P PST ADULT - NSICDXPROBLEM_GEN_ALL_CORE_FT
PROBLEM DIAGNOSES  Problem: Occlusion and stenosis of unspecified carotid artery  Assessment and Plan: Patient is scheduled Left carotid stent with EEG monitoring scheduled on 4/30/2019.  Preop instructions, pepcid, surgical scrub provided. Pt and Home health aide stated understanding.     Palvix and Asprin plan - Continue - Spoke to [facility] Surgical Coordinator to Dr. Macias- patient to continue as per Dr. Macias.   Cardiology evaluation in chart.   Allergy to Penicillin - OR booking notified   Discussed case with Dr. Lopez       Problem: Snoring  Assessment and Plan: MILLICENT precautions, OR booking notified.      Problem: Hypertension  Assessment and Plan: Patient and Home health aide instructed to take Losartan in the AM of surgery with a sip of water. PROBLEM DIAGNOSES  Problem: Occlusion and stenosis of unspecified carotid artery  Assessment and Plan: Patient is scheduled Left carotid stent with EEG monitoring scheduled on 4/30/2019.  Preop instructions, pepcid, surgical scrub provided. Pt and Home health aide stated understanding.     Palvix and Asprin plan - Continue - Spoke to [facility] Surgical Coordinator to Dr. Macias- patient to continue as per Dr. Macias.   Cardiology evaluation in chart. Last Echo results in chart.   Spoke to Dr. Mora [phone]-on the Phone 4/17/2019 at 3:10pm - Per Dr. Mora - since patient was admitted at [facility]  on April 7, 2019- all work up for surgery has been completed. Cardiology evalaution completed in Chart, Neurology consult completed in the Hospital - no need for further evaluation-per  Dr. Mora .Dr. Mora confirmed patient to continue on Asprin and Plavix.     Allergy to Penicillin - OR booking notified     Discussed case with Dr. Lopez       Problem: Snoring  Assessment and Plan: MILLICENT precautions, OR booking notified.      Problem: Hypertension  Assessment and Plan: Patient and Home health aide instructed to take Losartan in the AM of surgery with a sip of water. PROBLEM DIAGNOSES  Problem: Occlusion and stenosis of unspecified carotid artery  Assessment and Plan: Patient is scheduled Left carotid stent with EEG monitoring scheduled on 4/30/2019.  Preop instructions, pepcid, surgical scrub provided. Pt and Home health aide stated understanding. Instructed Patient and Home health aide to continue Asprin and Plavix - verbalized understanding.      Palvix and Asprin plan - Continue - Spoke to [facility] Surgical Coordinator to Dr. Macias- patient to continue as per Dr. Macias.   Cardiology evaluation in chart. Last Echo results in chart.   Spoke to Dr. Mora [phone]-on the Phone 4/17/2019 at 3:10pm - Per Dr. Mora - since patient was admitted at [facility]  on April 7, 2019- all work up for surgery has been completed. Cardiology evalaution completed in Chart, Neurology consult completed in the Hospital - no need for further evaluation-per  Dr. Mora .Dr. Mora confirmed patient to continue on Asprin and Plavix.     Allergy to Penicillin - OR booking notified     Discussed case with Dr. Lopez       Problem: Snoring  Assessment and Plan: MILLICENT precautions, OR booking notified.      Problem: Hypertension  Assessment and Plan: Patient and Home health aide instructed to take Losartan in the AM of surgery with a sip of water.

## 2019-04-17 NOTE — H&P PST ADULT - NSICDXPASTMEDICALHX_GEN_ALL_CORE_FT
PAST MEDICAL HISTORY:  Breast cancer     Cerebrovascular accident (CVA) April 2019    Dementia     HTN (hypertension)     Hypercholesterolemia     Hypertension PAST MEDICAL HISTORY:  Breast cancer     Cerebrovascular accident (CVA) April 2019    Dementia     HTN (hypertension)     Hypercholesterolemia     Hypertension     Occlusion and stenosis of unspecified carotid artery

## 2019-04-17 NOTE — H&P PST ADULT - ATTENDING COMMENTS
I have seen and examined this patient with the stroke neurology team.     History was reviewed with the patient and/or available family members.   ROS: All negative except documented in the HPI.   Neurological exam was performed and agree with exam as documented above.   Laboratory results and imaging studies were reviewed by me.   I agree with the neurology resident note as documented above.    88 years old woman with vascular risk factors of age and HTN is evaluated at [facility] for right-sided weakness. She is reported to be last known well at around 9:30 AM when she was brushing her teeth. Soon after that she noted right-sided weakness and sensory paresthesia, prompting her presentation to [facility]. Neurological examination showed mild right hemiparesis and difficulty with the gait. CT brain on admission did not show any evidence of acute infarct or hemorrhage. CTA head and neck to my eye showed severe to critical left supraclinoid ICA stenosis and moderate to severe proximal left ICA stenosis.    Impression:  Cerebral embolism with cerebral infarction. Probable left MCA distribution stroke involving subcortical structure like corona radiata - likely etiology being large vessel disease i.e. symptomatic intracranial atherosclerosis leading to artery to artery embolism versus focal cerebral hypoperfusion versus small vessel disease and less likely to be due to embolic stroke from a proximal source like cardiac/paradoxical source of embolism    Plan:   - Risks, benefits (considering potentially disabling deficits) and adverse reactions associated with IV tPA including hemorrhagic stroke were discussed with patient and available family member including daughter over the phone in detail. After ruling out all the contraindications and obtaining verbal consent, patient was treated with IV tPA  - Continue with  24 hours post IV tPA precautions including BP goal<185/110 mmHg before the tPA bolus administration and <180/105 mmHg after tPA bolus for first 24 hours followed by gradual normotension as per protocol  - Not a candidate for neurosurgical intervention based on CTA head and neck findings and low NIHSS on admission  - MRI brain without contrast   - Aspirin and pharmacological DVT prophylaxis to be considered at 24 hours after the IV tPA based on repeating brain imaging, SCDs for DVT prophylaxis in the interim  - Atorvastatin 80 mg at bedtime after establishing enteral access or passing swallow evaluation; further dose titration as per LDL results  - HbA1C and LDL, continue with aggressive vascular risk factors modifications   - TTE with bubble study and continue with telemetry to screen for possible cardiac source of embolism  - Prolonged cardiac monitoring with 30 days event monitor versus ICM to screen for occult cardiac arrhythmias like atrial fibrillation being the cardiac source of embolism to be considered based on results of above mentioned cardiac tests  - PT/OT/Speech and swallow evaluation   - Stroke education    Above mentioned plan was discussed with patient and available family member in detail. All the questions were answered and concerns were addressed. More than 82 minutes were spent in evaluation and management of this critically ill and unstable patient with an acute stroke.

## 2019-04-17 NOTE — H&P PST ADULT - NSANTHOSAYNRD_GEN_A_CORE
No. MILLICENT screening performed.  STOP BANG Legend: 0-2 = LOW Risk; 3-4 = INTERMEDIATE Risk; 5-8 = HIGH Risk

## 2019-04-17 NOTE — H&P PST ADULT - NEUROLOGICAL DETAILS
sensation intact/strength decreased/RLE only/alert and oriented x 3/responds to pain/responds to verbal commands

## 2019-04-17 NOTE — H&P PST ADULT - NSICDXPASTSURGICALHX_GEN_ALL_CORE_FT
PAST SURGICAL HISTORY:  H/O mastectomy, left PAST SURGICAL HISTORY:  H/O mastectomy, left     S/P cataract surgery Bilateral

## 2019-04-17 NOTE — H&P PST ADULT - ASSESSMENT
Patient is scheduled Left carotid stent with EEG monitoring scheduled on 4/30/2019.  Pre op diagnosis: Occlusion and stenosis of unspecified carotid artery.

## 2019-04-18 PROBLEM — I63.9 CEREBRAL INFARCTION, UNSPECIFIED: Chronic | Status: ACTIVE | Noted: 2019-04-17

## 2019-04-29 ENCOUNTER — TRANSCRIPTION ENCOUNTER (OUTPATIENT)
Age: 84
End: 2019-04-29

## 2019-04-29 NOTE — ASU PATIENT PROFILE, ADULT - PMH
Breast cancer    Cerebrovascular accident (CVA)  April 2019  Dementia    HTN (hypertension)    Hypercholesterolemia    Hypertension    Occlusion and stenosis of unspecified carotid artery

## 2019-04-30 ENCOUNTER — INPATIENT (INPATIENT)
Facility: HOSPITAL | Age: 84
LOS: 0 days | Discharge: HOME | End: 2019-05-01
Attending: STUDENT IN AN ORGANIZED HEALTH CARE EDUCATION/TRAINING PROGRAM | Admitting: STUDENT IN AN ORGANIZED HEALTH CARE EDUCATION/TRAINING PROGRAM
Payer: MEDICARE

## 2019-04-30 ENCOUNTER — APPOINTMENT (OUTPATIENT)
Dept: VASCULAR SURGERY | Facility: HOSPITAL | Age: 84
End: 2019-04-30

## 2019-04-30 VITALS
HEART RATE: 64 BPM | DIASTOLIC BLOOD PRESSURE: 70 MMHG | SYSTOLIC BLOOD PRESSURE: 173 MMHG | TEMPERATURE: 98 F | RESPIRATION RATE: 16 BRPM | WEIGHT: 143.96 LBS | HEIGHT: 59 IN | OXYGEN SATURATION: 96 %

## 2019-04-30 DIAGNOSIS — Z98.49 CATARACT EXTRACTION STATUS, UNSPECIFIED EYE: Chronic | ICD-10-CM

## 2019-04-30 DIAGNOSIS — I65.29 OCCLUSION AND STENOSIS OF UNSPECIFIED CAROTID ARTERY: ICD-10-CM

## 2019-04-30 DIAGNOSIS — Z90.12 ACQUIRED ABSENCE OF LEFT BREAST AND NIPPLE: Chronic | ICD-10-CM

## 2019-04-30 LAB — RH IG SCN BLD-IMP: POSITIVE — SIGNIFICANT CHANGE UP

## 2019-04-30 PROCEDURE — 37215 TRANSCATH STENT CCA W/EPS: CPT

## 2019-04-30 RX ORDER — CHLORHEXIDINE GLUCONATE 213 G/1000ML
1 SOLUTION TOPICAL
Qty: 0 | Refills: 0 | Status: DISCONTINUED | OUTPATIENT
Start: 2019-04-30 | End: 2019-05-01

## 2019-04-30 RX ORDER — SODIUM CHLORIDE 9 MG/ML
1000 INJECTION, SOLUTION INTRAVENOUS
Qty: 0 | Refills: 0 | Status: DISCONTINUED | OUTPATIENT
Start: 2019-04-30 | End: 2019-04-30

## 2019-04-30 RX ORDER — ONDANSETRON 8 MG/1
4 TABLET, FILM COATED ORAL ONCE
Qty: 0 | Refills: 0 | Status: DISCONTINUED | OUTPATIENT
Start: 2019-04-30 | End: 2019-05-01

## 2019-04-30 RX ORDER — ESCITALOPRAM OXALATE 10 MG/1
5 TABLET, FILM COATED ORAL DAILY
Qty: 0 | Refills: 0 | Status: DISCONTINUED | OUTPATIENT
Start: 2019-04-30 | End: 2019-05-01

## 2019-04-30 RX ORDER — LOSARTAN POTASSIUM 100 MG/1
50 TABLET, FILM COATED ORAL DAILY
Qty: 0 | Refills: 0 | Status: DISCONTINUED | OUTPATIENT
Start: 2019-05-01 | End: 2019-05-01

## 2019-04-30 RX ORDER — ACETAMINOPHEN 500 MG
1000 TABLET ORAL ONCE
Qty: 0 | Refills: 0 | Status: DISCONTINUED | OUTPATIENT
Start: 2019-04-30 | End: 2019-05-01

## 2019-04-30 RX ORDER — CLOPIDOGREL BISULFATE 75 MG/1
75 TABLET, FILM COATED ORAL DAILY
Qty: 0 | Refills: 0 | Status: DISCONTINUED | OUTPATIENT
Start: 2019-04-30 | End: 2019-05-01

## 2019-04-30 RX ORDER — ASPIRIN/CALCIUM CARB/MAGNESIUM 324 MG
81 TABLET ORAL DAILY
Qty: 0 | Refills: 0 | Status: DISCONTINUED | OUTPATIENT
Start: 2019-04-30 | End: 2019-04-30

## 2019-04-30 RX ORDER — NICARDIPINE HYDROCHLORIDE 30 MG/1
5 CAPSULE, EXTENDED RELEASE ORAL
Qty: 40 | Refills: 0 | Status: DISCONTINUED | OUTPATIENT
Start: 2019-04-30 | End: 2019-05-01

## 2019-04-30 RX ORDER — ASPIRIN/CALCIUM CARB/MAGNESIUM 324 MG
81 TABLET ORAL DAILY
Qty: 0 | Refills: 0 | Status: DISCONTINUED | OUTPATIENT
Start: 2019-04-30 | End: 2019-05-01

## 2019-04-30 RX ORDER — OXYCODONE HYDROCHLORIDE 5 MG/1
5 TABLET ORAL EVERY 4 HOURS
Qty: 0 | Refills: 0 | Status: DISCONTINUED | OUTPATIENT
Start: 2019-04-30 | End: 2019-05-01

## 2019-04-30 RX ORDER — LOSARTAN POTASSIUM 100 MG/1
25 TABLET, FILM COATED ORAL AT BEDTIME
Qty: 0 | Refills: 0 | Status: DISCONTINUED | OUTPATIENT
Start: 2019-04-30 | End: 2019-05-01

## 2019-04-30 RX ORDER — FENTANYL CITRATE 50 UG/ML
25 INJECTION INTRAVENOUS
Qty: 0 | Refills: 0 | Status: DISCONTINUED | OUTPATIENT
Start: 2019-04-30 | End: 2019-04-30

## 2019-04-30 RX ORDER — FENTANYL CITRATE 50 UG/ML
50 INJECTION INTRAVENOUS
Qty: 0 | Refills: 0 | Status: DISCONTINUED | OUTPATIENT
Start: 2019-04-30 | End: 2019-04-30

## 2019-04-30 RX ORDER — SODIUM CHLORIDE 9 MG/ML
1000 INJECTION, SOLUTION INTRAVENOUS
Qty: 0 | Refills: 0 | Status: DISCONTINUED | OUTPATIENT
Start: 2019-04-30 | End: 2019-05-01

## 2019-04-30 RX ORDER — PANTOPRAZOLE SODIUM 20 MG/1
40 TABLET, DELAYED RELEASE ORAL
Qty: 0 | Refills: 0 | Status: DISCONTINUED | OUTPATIENT
Start: 2019-04-30 | End: 2019-05-01

## 2019-04-30 RX ORDER — ATORVASTATIN CALCIUM 80 MG/1
80 TABLET, FILM COATED ORAL AT BEDTIME
Qty: 0 | Refills: 0 | Status: DISCONTINUED | OUTPATIENT
Start: 2019-04-30 | End: 2019-05-01

## 2019-04-30 RX ADMIN — ESCITALOPRAM OXALATE 5 MILLIGRAM(S): 10 TABLET, FILM COATED ORAL at 22:10

## 2019-04-30 RX ADMIN — NICARDIPINE HYDROCHLORIDE 25 MG/HR: 30 CAPSULE, EXTENDED RELEASE ORAL at 17:01

## 2019-04-30 RX ADMIN — SODIUM CHLORIDE 100 MILLILITER(S): 9 INJECTION, SOLUTION INTRAVENOUS at 18:07

## 2019-04-30 RX ADMIN — LOSARTAN POTASSIUM 25 MILLIGRAM(S): 100 TABLET, FILM COATED ORAL at 22:10

## 2019-04-30 RX ADMIN — ATORVASTATIN CALCIUM 80 MILLIGRAM(S): 80 TABLET, FILM COATED ORAL at 22:10

## 2019-04-30 RX ADMIN — Medication 81 MILLIGRAM(S): at 22:10

## 2019-04-30 RX ADMIN — NICARDIPINE HYDROCHLORIDE 25 MG/HR: 30 CAPSULE, EXTENDED RELEASE ORAL at 20:02

## 2019-04-30 RX ADMIN — CLOPIDOGREL BISULFATE 75 MILLIGRAM(S): 75 TABLET, FILM COATED ORAL at 22:10

## 2019-04-30 RX ADMIN — SODIUM CHLORIDE 100 MILLILITER(S): 9 INJECTION, SOLUTION INTRAVENOUS at 20:02

## 2019-04-30 NOTE — CONSULT NOTE ADULT - SUBJECTIVE AND OBJECTIVE BOX
SICU Consultation Note  =====================================================  HPI: Patient is a 88 year old female  Urdu speaking with a PMHx of HTN, Breast CA. s/p L. mastectomy, with a recent hospitalization to [facility] on April 7th 2019 who presented with RUE/RLE paresthesia and weakness, s/p code stroke, tPA was administered. As per neurology patient with left posterior frontal distributions infarcts, likely etiology being large vessel disease due to left carotid disease . Patient was referred to Dr. Mora  for an evaluation. Patient presents to [facility] for an evaluation for a scheduled Left carotid stent with EEG monitoring scheduled on 4/30/2019.  Pre op diagnosis: Occlusion and stenosis of unspecified carotid artery.     Duplex carotid showing 50-69% left ICA stenosis. Calcified plaques noted in the left ICA/bulb. Intimal thickening of left carotid arteries.    Pt is s/p TCAR (transcarotid arterial revasculartization) with ischemic preconditioning. Reported that patient had labile blood pressure intraop requiring intermittent nitroglycerin infusion.   SICU consulted for postop blood pressure control.  In PACU pt with a-line BP of 170/70, NIBP for  158/72; HR 60, O2 99% on 2L NC.   Pt endorses no dizziness, lightheadedness, CP, SOB, N/V, abdominal pain.     Surgery Information   Case Duration:      EBL:   50ml    IV Fluids:  600 crystalloid     Blood Products:         Complications:        HISTORY  88y Female  Allergies: penicillins (Unknown)    PAST MEDICAL & SURGICAL HISTORY:  Occlusion and stenosis of unspecified carotid artery  Cerebrovascular accident (CVA): April 2019  Dementia  Breast cancer  HTN (hypertension)  Hypercholesterolemia  Hypertension  S/P cataract surgery: Bilateral  H/O mastectomy, left    FAMILY HISTORY:  No pertinent family history in first degree relatives      SOCIAL HISTORY:      ADVANCE DIRECTIVES: Presumed Full Code     REVIEW OF SYSTEMS:    General: Non-Contributory  Skin/Breast: Non-Contributory  Ophthalmologic: Non-Contributory  ENMT: Non-Contributory  Respiratory and Thorax: Non-Contributory  Cardiovascular: Non-Contributory  Gastrointestinal: Non-Contributory  Genitourinary: Non-Contributory  Musculoskeletal: Non-Contributory  Neurological: Non-Contributory  Psychiatric: Non-Contributory  Hematology/Lymphatics: Non-Contributory  Endocrine: Non-Contributory  Allergic/Immunologic: Non-Contributory    HOME MEDICATIONS:  ***    CURRENT MEDICATIONS:   --------------------------------------------------------------------------------------  Neurologic Medications  acetaminophen  IVPB .. 1000 milliGRAM(s) IV Intermittent once PRN Mild Pain (1 - 3)  escitalopram 5 milliGRAM(s) Oral daily  fentaNYL    Injectable 25 MICROGram(s) IV Push every 5 minutes PRN Moderate Pain (4 - 6)  fentaNYL    Injectable 50 MICROGram(s) IV Push every 10 minutes PRN Severe Pain (7 - 10)  ondansetron Injectable 4 milliGRAM(s) IV Push once PRN Nausea and/or Vomiting    Respiratory Medications    Cardiovascular Medications  losartan 25 milliGRAM(s) Oral at bedtime    Gastrointestinal Medications  lactated ringers. 1000 milliLiter(s) IV Continuous <Continuous>  lactated ringers. 1000 milliLiter(s) IV Continuous <Continuous>  pantoprazole    Tablet 40 milliGRAM(s) Oral before breakfast PRN GERD    Genitourinary Medications    Hematologic/Oncologic Medications  aspirin enteric coated 81 milliGRAM(s) Oral daily  clopidogrel Tablet 75 milliGRAM(s) Oral daily    Antimicrobial/Immunologic Medications    Endocrine/Metabolic Medications  atorvastatin 80 milliGRAM(s) Oral at bedtime    Topical/Other Medications    --------------------------------------------------------------------------------------    VITAL SIGNS, INS/OUTS (last 24 hours):  --------------------------------------------------------------------------------------  T(C): 36.5 (04-30-19 @ 15:15), Max: 36.5 (04-30-19 @ 15:15)  HR: 60 (04-30-19 @ 16:15) (59 - 70)  BP: 149/93 (04-30-19 @ 16:15) (136/72 - 173/70)  BP(mean): 104 (04-30-19 @ 16:15) (80 - 104)  ABP: 161/67 (04-30-19 @ 16:15) (160/65 - 175/71)  ABP(mean): 103 (04-30-19 @ 16:15) (101 - 110)  RR: 15 (04-30-19 @ 16:15) (15 - 19)  SpO2: 94% (04-30-19 @ 16:15) (94% - 99%)  Wt(kg): --  CVP(mm Hg): --  CI: --  CAPILLARY BLOOD GLUCOSE       N/A      --------------------------------------------------------------------------------------    EXAM  NEUROLOGY  RASS:   	GCS:    Exam: Normal, NAD, alert, oriented x 2 (person, place), Cymro speaking,  no focal deficits.     HEENT  Exam: Normocephalic, atraumatic.  EOMI     RESPIRATORY  Exam: Lungs clear to auscultation, Normal expansion/effort.  Mechanical Ventilation:     CARDIOVASCULAR  Exam: S1, S2.  Regular rate and rhythm.  Peripheral edema      GI/NUTRITION  Exam: Abdomen soft, Non-tender, Non-distended.    Wound:    Current Diet:  NPO     VASCULAR  Exam: Extremities warm, pink, well-perfused.     MUSCULOSKELETAL  Exam: All extremities moving spontaneously without limitations.      SKIN:  Exam: Good skin turgor, no skin breakdown.     METABOLIC/FLUIDS/ELECTROLYTES  lactated ringers. 1000 milliLiter(s) IV Continuous <Continuous>  lactated ringers. 1000 milliLiter(s) IV Continuous <Continuous>      HEMATOLOGIC  [x] DVT Prophylaxis: aspirin enteric coated 81 milliGRAM(s) Oral daily  clopidogrel Tablet 75 milliGRAM(s) Oral daily    Transfusions:	[] PRBC	[] Platelets		[] FFP	[] Cryoprecipitate    INFECTIOUS DISEASE  Antimicrobials/Immunologic Medications:    Day #  	of    ***    Tubes/Lines/Drains  ***  [x] Peripheral IV  [] Central Venous Line     	[] R	[] L	[] IJ	[] Fem	[] SC	Date Placed:   [] Arterial Line		[] R	[] L	[] Fem	[] Rad	[] Ax	Date Placed:   [] PICC:         	[] Midline		[] Mediport  [] Urinary Catheter		Date Placed:     LABS  --------------------------------------------------------------------------------------    --------------------------------------------------------------------------------------    OTHER LABS    IMAGING RESULTS  Echo: < from: Transthoracic Echocardiogram (04.09.19 @ 14:42) >  EF 70%  Normal left ventricular systolic function. No segmental  wall motion abnormalities.  Aortic atherosclerosis.  *** No previous Echo exam.  -----------------------------------------    < end of copied text >    CT:   Xray:

## 2019-04-30 NOTE — CHART NOTE - NSCHARTNOTEFT_GEN_A_CORE
Post Operative Note      Procedure: Left TCAR    Subjective: Patient seen and examined. She states that she has no pain  BP better controlled now  off nicardipine drip    Objective:    T(C): 36.8 (04-30-19 @ 20:00), Max: 36.8 (04-30-19 @ 20:00)  HR: 76 (04-30-19 @ 21:00) (59 - 81)  BP: 150/83 (04-30-19 @ 17:45) (136/72 - 175/69)  RR: 17 (04-30-19 @ 21:00) (15 - 20)  SpO2: 99% (04-30-19 @ 20:00) (94% - 100%)      04-30-19 @ 07:01  -  04-30-19 @ 21:58  --------------------------------------------------------  IN: 500 mL / OUT: 575 mL / NET: -75 mL        Physical Exam:  General: NAD  Left neck dressing c/d/i  Right groin dressing c/d/i  soft, no swellings palpated    Medications:   MEDICATIONS  (STANDING):  aspirin enteric coated 81 milliGRAM(s) Oral daily  atorvastatin 80 milliGRAM(s) Oral at bedtime  chlorhexidine 4% Liquid 1 Application(s) Topical <User Schedule>  clopidogrel Tablet 75 milliGRAM(s) Oral daily  escitalopram 5 milliGRAM(s) Oral daily  lactated ringers. 1000 milliLiter(s) (100 mL/Hr) IV Continuous <Continuous>  losartan 25 milliGRAM(s) Oral at bedtime  niCARdipine Infusion 5 mG/Hr (25 mL/Hr) IV Continuous <Continuous>    MEDICATIONS  (PRN):  acetaminophen  IVPB .. 1000 milliGRAM(s) IV Intermittent once PRN Mild Pain (1 - 3)  ondansetron Injectable 4 milliGRAM(s) IV Push once PRN Nausea and/or Vomiting  oxyCODONE    IR 5 milliGRAM(s) Oral every 4 hours PRN Moderate to Severe Pain  pantoprazole    Tablet 40 milliGRAM(s) Oral before breakfast PRN GERD        Assessment/Plan:  88F s/p left TCAR    - pain control  - BP control ( - 160)  - ASA/Plavix  - diet: CLD  - appreciate excellent care per SICU    C Team  [id number]

## 2019-04-30 NOTE — ASU PREOP CHECKLIST - 2.
used Wallis[place]  carolyn #[phone] to get the pt's permission to use her daughter Keshia to translate for her

## 2019-04-30 NOTE — CONSULT NOTE ADULT - ASSESSMENT
ASSESSMENT:  88y Female PMH HTN, HLD, breast ca s/p l mastectomy, CVA, carotid stenosis s/p TCAR     PLAN:    Neurologic:   -pain control PRN- Tylenol  -escitalopram  Respiratory:   -saturating well on 2L NC. keep O2> 92%. wean off O2 as tolerated.   -Incentive spirometry  Cardiovascular:   -Strict BP control 110-160 per vascular.   -will start cardene infusion   -start losartan later tonight.   -continue statin  -give ASA/Plavix  Gastrointestinal/Nutrition:   -NPO for now. will advance diet as tolerated.   -protonix PRN for GERD  Renal/Genitourinary:   -LR @ 100ml/hr. will IVL when tolerating PO intake  -strict I/Os  Hematologic:   -ASA/plavix  -SCDs  Infectious Disease:   -no active issues  Tubes/Lines/Drains: a-line, PIV  Endocrine:   -no active issues    Disposition: SICU    --------------------------------------------------------------------------------------    Critical Care Diagnoses:

## 2019-05-01 ENCOUNTER — TRANSCRIPTION ENCOUNTER (OUTPATIENT)
Age: 84
End: 2019-05-01

## 2019-05-01 ENCOUNTER — OUTPATIENT (OUTPATIENT)
Dept: OUTPATIENT SERVICES | Facility: HOSPITAL | Age: 84
LOS: 1 days | End: 2019-05-01

## 2019-05-01 VITALS
DIASTOLIC BLOOD PRESSURE: 67 MMHG | HEART RATE: 70 BPM | OXYGEN SATURATION: 98 % | TEMPERATURE: 98 F | SYSTOLIC BLOOD PRESSURE: 157 MMHG | RESPIRATION RATE: 22 BRPM

## 2019-05-01 DIAGNOSIS — Z71.89 OTHER SPECIFIED COUNSELING: ICD-10-CM

## 2019-05-01 DIAGNOSIS — Z98.49 CATARACT EXTRACTION STATUS, UNSPECIFIED EYE: Chronic | ICD-10-CM

## 2019-05-01 DIAGNOSIS — Z90.12 ACQUIRED ABSENCE OF LEFT BREAST AND NIPPLE: Chronic | ICD-10-CM

## 2019-05-01 PROBLEM — I65.29 OCCLUSION AND STENOSIS OF UNSPECIFIED CAROTID ARTERY: Chronic | Status: ACTIVE | Noted: 2019-04-17

## 2019-05-01 LAB
ANION GAP SERPL CALC-SCNC: 13 MMO/L — SIGNIFICANT CHANGE UP (ref 7–14)
BUN SERPL-MCNC: 10 MG/DL — SIGNIFICANT CHANGE UP (ref 7–23)
CALCIUM SERPL-MCNC: 9.4 MG/DL — SIGNIFICANT CHANGE UP (ref 8.4–10.5)
CHLORIDE SERPL-SCNC: 107 MMOL/L — SIGNIFICANT CHANGE UP (ref 98–107)
CO2 SERPL-SCNC: 23 MMOL/L — SIGNIFICANT CHANGE UP (ref 22–31)
CREAT SERPL-MCNC: 0.81 MG/DL — SIGNIFICANT CHANGE UP (ref 0.5–1.3)
GLUCOSE SERPL-MCNC: 162 MG/DL — HIGH (ref 70–99)
HCT VFR BLD CALC: 38.5 % — SIGNIFICANT CHANGE UP (ref 34.5–45)
HGB BLD-MCNC: 12.4 G/DL — SIGNIFICANT CHANGE UP (ref 11.5–15.5)
MAGNESIUM SERPL-MCNC: 1.8 MG/DL — SIGNIFICANT CHANGE UP (ref 1.6–2.6)
MCHC RBC-ENTMCNC: 27.9 PG — SIGNIFICANT CHANGE UP (ref 27–34)
MCHC RBC-ENTMCNC: 32.2 % — SIGNIFICANT CHANGE UP (ref 32–36)
MCV RBC AUTO: 86.7 FL — SIGNIFICANT CHANGE UP (ref 80–100)
NRBC # FLD: 0 K/UL — SIGNIFICANT CHANGE UP (ref 0–0)
PHOSPHATE SERPL-MCNC: 1.9 MG/DL — LOW (ref 2.5–4.5)
PLATELET # BLD AUTO: 148 K/UL — LOW (ref 150–400)
PMV BLD: 10.4 FL — SIGNIFICANT CHANGE UP (ref 7–13)
POTASSIUM SERPL-MCNC: 4.2 MMOL/L — SIGNIFICANT CHANGE UP (ref 3.5–5.3)
POTASSIUM SERPL-SCNC: 4.2 MMOL/L — SIGNIFICANT CHANGE UP (ref 3.5–5.3)
RBC # BLD: 4.44 M/UL — SIGNIFICANT CHANGE UP (ref 3.8–5.2)
RBC # FLD: 13.8 % — SIGNIFICANT CHANGE UP (ref 10.3–14.5)
SODIUM SERPL-SCNC: 143 MMOL/L — SIGNIFICANT CHANGE UP (ref 135–145)
WBC # BLD: 7.09 K/UL — SIGNIFICANT CHANGE UP (ref 3.8–10.5)
WBC # FLD AUTO: 7.09 K/UL — SIGNIFICANT CHANGE UP (ref 3.8–10.5)

## 2019-05-01 RX ORDER — MAGNESIUM SULFATE 500 MG/ML
1 VIAL (ML) INJECTION ONCE
Qty: 0 | Refills: 0 | Status: COMPLETED | OUTPATIENT
Start: 2019-05-01 | End: 2019-05-01

## 2019-05-01 RX ORDER — POTASSIUM PHOSPHATE, MONOBASIC POTASSIUM PHOSPHATE, DIBASIC 236; 224 MG/ML; MG/ML
15 INJECTION, SOLUTION INTRAVENOUS ONCE
Qty: 0 | Refills: 0 | Status: COMPLETED | OUTPATIENT
Start: 2019-05-01 | End: 2019-05-01

## 2019-05-01 RX ORDER — OXYCODONE HYDROCHLORIDE 5 MG/1
1 TABLET ORAL
Qty: 16 | Refills: 0 | OUTPATIENT
Start: 2019-05-01 | End: 2019-05-04

## 2019-05-01 RX ADMIN — Medication 100 GRAM(S): at 04:57

## 2019-05-01 RX ADMIN — ESCITALOPRAM OXALATE 5 MILLIGRAM(S): 10 TABLET, FILM COATED ORAL at 11:40

## 2019-05-01 RX ADMIN — Medication 81 MILLIGRAM(S): at 11:40

## 2019-05-01 RX ADMIN — CLOPIDOGREL BISULFATE 75 MILLIGRAM(S): 75 TABLET, FILM COATED ORAL at 11:40

## 2019-05-01 RX ADMIN — POTASSIUM PHOSPHATE, MONOBASIC POTASSIUM PHOSPHATE, DIBASIC 62.5 MILLIMOLE(S): 236; 224 INJECTION, SOLUTION INTRAVENOUS at 04:57

## 2019-05-01 RX ADMIN — LOSARTAN POTASSIUM 50 MILLIGRAM(S): 100 TABLET, FILM COATED ORAL at 05:43

## 2019-05-01 NOTE — DISCHARGE NOTE PROVIDER - CARE PROVIDER_API CALL
Maykel Mora)  Surgery  Vascular  [address] [facility], [address] [place] [address]  Phone: [phone]  Fax: [phone]  Follow Up Time:

## 2019-05-01 NOTE — PROGRESS NOTE ADULT - ASSESSMENT
ASSESSMENT:  88y Female PMH HTN, HLD, breast ca s/p l mastectomy, CVA, carotid stenosis s/p TCAR     PLAN:    Neurologic:   -pain control PRN- Tylenol  -agitation- frequent reorientation, enhanced supervision, Haldol PRN for agitation   -escitalopram  Respiratory:   -saturating well on RA. keep O2> 92%.   -Incentive spirometry, OOB  Cardiovascular:   -Strict BP control 110-160 per vascular.   -cardene infusion currently on hold.   -c/w losartan  -continue statin  -ASA/Plavix  Gastrointestinal/Nutrition:   -on Clears, will advance diet as tolerated.   -protonix PRN for GERD  Renal/Genitourinary:   -IVL, bolus PRN  -strict I/Os  Hematologic:   -ASA/plavix  -SCDs  Infectious Disease:   -no active issues  Tubes/Lines/Drains: a-line, PIV  Endocrine:   -no active issues    Disposition: Floor pending BP control  Pt c/s  --------------------------------------------------------------------------------------    Critical Care Diagnoses:

## 2019-05-01 NOTE — DISCHARGE NOTE NURSING/CASE MANAGEMENT/SOCIAL WORK - NSDCPNDISPN_GEN_ALL_CORE
Education provided on the pain management plan of care/Activities of daily living, including home environment that might     exacerbate pain or reduce effectiveness of the pain management plan of care as well as strategies to address these issues/Side effects of pain management treatment/Opioids not applicable/not prescribed

## 2019-05-01 NOTE — PROGRESS NOTE ADULT - SUBJECTIVE AND OBJECTIVE BOX
SICU Daily Progress Note  =====================================================  Interval/Overnight Events:   Pt started on Cardene infusion postoperatively for hypertension. Blood pressure improved with good control Cardene gtt turned off. Patient with agitation, getting out of bed; pt was started with enhanced supervision.    POD #          	SICU Day #    ***    HPI:  Patient is a 88 year old female  Danish speaking with a PMHx of HTN, Breast CA. s/p L. mastectomy, with a recent hospitalization to [facility] on April 7th 2019 who presented with RUE/RLE paresthesia and weakness, s/p code stroke, tPA was administered. As per neurology patient with left posterior frontal distributions infarcts, likely etiology being large vessel disease due to left carotid disease .   Duplex carotid showing 50-69% left ICA stenosis. Calcified plaques noted in the left ICA/bulb. Intimal thickening of left carotid arteries.    Pt is s/p TCAR (transcarotid arterial revasculartization) with ischemic preconditioning. Reported that patient had labile blood pressure intraop requiring intermittent nitroglycerin infusion.   SICU consulted for postop blood pressure control.  In PACU pt with a-line BP of 170/70, NIBP for  158/72; HR 60, O2 99% on 2L NC.   Pt endorses no dizziness, lightheadedness, CP, SOB, N/V, abdominal pain.       PMH:   ***    Allergies: penicillins (Unknown)      MEDICATIONS:   --------------------------------------------------------------------------------------  Neurologic Medications  acetaminophen  IVPB .. 1000 milliGRAM(s) IV Intermittent once PRN Mild Pain (1 - 3)  escitalopram 5 milliGRAM(s) Oral daily  ondansetron Injectable 4 milliGRAM(s) IV Push once PRN Nausea and/or Vomiting  oxyCODONE    IR 5 milliGRAM(s) Oral every 4 hours PRN Moderate to Severe Pain    Respiratory Medications    Cardiovascular Medications  losartan 25 milliGRAM(s) Oral at bedtime  losartan 50 milliGRAM(s) Oral daily  niCARdipine Infusion 5 mG/Hr IV Continuous <Continuous>    Gastrointestinal Medications  lactated ringers. 1000 milliLiter(s) IV Continuous <Continuous>  pantoprazole    Tablet 40 milliGRAM(s) Oral before breakfast PRN GERD    Genitourinary Medications    Hematologic/Oncologic Medications  aspirin enteric coated 81 milliGRAM(s) Oral daily  clopidogrel Tablet 75 milliGRAM(s) Oral daily    Antimicrobial/Immunologic Medications    Endocrine/Metabolic Medications  atorvastatin 80 milliGRAM(s) Oral at bedtime    Topical/Other Medications  chlorhexidine 4% Liquid 1 Application(s) Topical <User Schedule>    --------------------------------------------------------------------------------------    VITAL SIGNS, INS/OUTS (last 24 hours):  --------------------------------------------------------------------------------------  ((Insert SICU Vitals/Is+Os here))***  --------------------------------------------------------------------------------------    EXAM  NEUROLOGY  RASS:   	GCS:    Exam: Normal, NAD, alert, oriented x2, no focal deficits.     HEENT  Exam: Normocephalic, atraumatic, EOMI.       RESPIRATORY  Exam: Lungs clear to auscultation, Normal expansion/effort.    neck supple. no hematoma. .  Mechanical Ventilation:     CARDIOVASCULAR  Exam: S1, S2.  Regular rate and rhythm.       GI/NUTRITION  Exam: Abdomen soft, Non-tender, Non-distended.    Wound:   Current Diet:      VASCULAR  Exam: Extremities warm, pink, well-perfused.   MUSCULOSKELETAL  Exam: All extremities moving spontaneously without limitations. no groin hematoma (right).  SKIN  Exam: Good skin turgor, no skin breakdown.    METABOLIC/FLUIDS/ELECTROLYTES  lactated ringers. 1000 milliLiter(s) IV Continuous <Continuous>      HEMATOLOGIC  [x] VTE Prophylaxis: aspirin enteric coated 81 milliGRAM(s) Oral daily  clopidogrel Tablet 75 milliGRAM(s) Oral daily    Transfusions:	[] PRBC	[] Platelets		[] FFP	[] Cryoprecipitate    INFECTIOUS DISEASE  Antimicrobials/Immunologic Medications:    Day #      of     ***    Tubes/Lines/Drains  ***  [x] Peripheral IV  [] Central Venous Line     	[] R	[] L	[] IJ	[] Fem	[] SC	Date Placed:   [] Arterial Line		[] R	[] L	[] Fem	[] Rad	[] Ax	Date Placed:   [] PICC		[] Midline		[] Mediport  [] Urinary Catheter		Date Placed:   [x] Necessity of urinary, arterial, and venous catheters discussed    LABS  --------------------------------------------------------------------------------------  ((Insert SICU Labs here))***  --------------------------------------------------------------------------------------    OTHER LABORATORY:     IMAGING STUDIES:   CXR:     88y Female PMH HTN, HLD, breast ca s/p l mastectomy, CVA, carotid stenosis s/p TCAR     PLAN:    Neurologic:   -pain control PRN- Tylenol  -escitalopram  Respiratory:   -saturating well on 2L NC. keep O2> 92%. wean off O2 as tolerated.   -Incentive spirometry  Cardiovascular:   -Strict BP control 110-160 per vascular.   -will start cardene infusion   -start losartan later tonight.   -continue statin  -give ASA/Plavix  Gastrointestinal/Nutrition:   -NPO for now. will advance diet as tolerated.   -protonix PRN for GERD  Renal/Genitourinary:   -LR @ 100ml/hr. will IVL when tolerating PO intake  -strict I/Os  Hematologic:   -ASA/plavix  -SCDs  Infectious Disease:   -no active issues  Tubes/Lines/Drains: a-line, PIV  Endocrine:   -no active issues    Disposition: SICU    --------------------------------------------------------------------------------------    Critical Care Diagnoses:    --------------------------------------------------------------------------------------    Critical Care Diagnoses:

## 2019-05-01 NOTE — PROGRESS NOTE ADULT - SUBJECTIVE AND OBJECTIVE BOX
C Team Surgery Progress Note     S: Patient resting comfortably on morning rounds. Pain well-controlled currently. No new complaints. Stable      MEDICATIONS  (STANDING):  aspirin enteric coated 81 milliGRAM(s) Oral daily  atorvastatin 80 milliGRAM(s) Oral at bedtime  chlorhexidine 4% Liquid 1 Application(s) Topical <User Schedule>  clopidogrel Tablet 75 milliGRAM(s) Oral daily  escitalopram 5 milliGRAM(s) Oral daily  losartan 25 milliGRAM(s) Oral at bedtime  losartan 50 milliGRAM(s) Oral daily    MEDICATIONS  (PRN):  acetaminophen  IVPB .. 1000 milliGRAM(s) IV Intermittent once PRN Mild Pain (1 - 3)  ondansetron Injectable 4 milliGRAM(s) IV Push once PRN Nausea and/or Vomiting  oxyCODONE    IR 5 milliGRAM(s) Oral every 4 hours PRN Moderate to Severe Pain  pantoprazole    Tablet 40 milliGRAM(s) Oral before breakfast PRN GERD      Physical Exam:        04-30-19 @ 07:01  -  05-01-19 @ 07:00  --------------------------------------------------------  IN: 1350 mL / OUT: 975 mL / NET: 375 mL    05-01-19 @ 07:01 - 05-01-19 @ 08:28  --------------------------------------------------------  IN: 0 mL / OUT: 150 mL / NET: -150 mL        General: NAD  Resp: Nonlabored  Neuro: CN 2-12 intact  Neck: dressing c/d/i, no hematoma  R groin: dressing c/d/i    UE:  Skin Intact  C5-T1 motor intact/ SILT  WWP    LE:   Skin Intact  L2-S1 motor intact/ SILT  WWP      LABS:                        12.4   7.09  )-----------( 148      ( 01 May 2019 02:36 )             38.5     05-01    143  |  107  |  10  ----------------------------<  162<H>  4.2   |  23  |  0.81    Ca    9.4      01 May 2019 02:36  Phos  1.9     05-01  Mg     1.8     05-01

## 2019-05-01 NOTE — DISCHARGE NOTE NURSING/CASE MANAGEMENT/SOCIAL WORK - NSDCPEPTSTRK_GEN_ALL_CORE
Stroke warning signs and symptoms/Signs and symptoms of stroke/Stroke support groups for patients, families, and friends/Prescribed medications/Risk factors for stroke/Call 911 for stroke/Need for follow up after discharge/Stroke education booklet

## 2019-05-01 NOTE — PROGRESS NOTE ADULT - ASSESSMENT
88yoF s/p L TCAR 4/30    Neuro: Pain control  Resp: IS  GI: Regular diet, bowel reg  MSK: WBAT  Heme: DVT PPX  ASA, plavix, statin on DC  Dispo: Home from SICU this afternoon   FU Dr. Mora 7-10days from discharge

## 2019-05-01 NOTE — DISCHARGE NOTE PROVIDER - HOSPITAL COURSE
Patient is a 88 year old female  [place] speaking with a PMHx of HTN, Breast CA. s/p L. mastectomy, with a recent hospitalization to [facility] on April 7th 2019 who presented with RUE/RLE paresthesia and weakness, s/p code stroke, tPA was administered. As per neurology patient with left posterior frontal distributions infarcts, likely etiology being large vessel disease due to left carotid disease . Patient was referred to Dr. Mora  for an evaluation. Patient was scheduled for an elective TCAR on 4/30/19 with Dr. Mora.         Patient was admitted to [facility] on 4/30/19 and taken to the OR with Dr. Mora where she underwent Left transcarotid stent. During procedure patient BP was elevated and difficult to control SBP up to 240s, SICU consulted from OR for BP monitoring. Patient was transferred from OR to SICU where she was monitored for elevated BP which remained well controlled overnight. Patient had no complications during recovery. Patient is tolerating a regular diet, pain is well controlled, ambulating well and voiding.        Per team and attending patient is stable and may be discharged home with aspirin, plavix, and atorvastatin follow up with Dr. Mora in 1 week as an outpatient.

## 2019-05-01 NOTE — DISCHARGE NOTE PROVIDER - NSDCCPCAREPLAN_GEN_ALL_CORE_FT
PRINCIPAL DISCHARGE DIAGNOSIS  Diagnosis: Left carotid artery stenosis  Assessment and Plan of Treatment:

## 2019-05-01 NOTE — PROGRESS NOTE ADULT - SUBJECTIVE AND OBJECTIVE BOX
ANESTHESIA POSTOP CHECK    88y Female POSTOP DAY 1 S/P transcarotid revascularization L. Carotid artery    Vital Signs Last 24 Hrs  T(C): 36.6 (01 May 2019 08:00), Max: 37.1 (01 May 2019 04:00)  T(F): 97.8 (01 May 2019 08:00), Max: 98.7 (01 May 2019 04:00)  HR: 70 (01 May 2019 08:00) (59 - 81)  BP: 150/83 (30 Apr 2019 17:45) (136/72 - 175/69)  BP(mean): 97 (30 Apr 2019 17:45) (80 - 104)  RR: 20 (01 May 2019 08:00) (14 - 22)  SpO2: 97% (01 May 2019 08:00) (93% - 100%)  I&O's Summary    30 Apr 2019 07:01  -  01 May 2019 07:00  --------------------------------------------------------  IN: 1350 mL / OUT: 975 mL / NET: 375 mL    01 May 2019 07:01  -  01 May 2019 08:51  --------------------------------------------------------  IN: 0 mL / OUT: 150 mL / NET: -150 mL      Off Nicardipine drip, doing well.   [x] NO APPARENT ANESTHESIA COMPLICATIONS

## 2019-05-10 ENCOUNTER — APPOINTMENT (OUTPATIENT)
Dept: VASCULAR SURGERY | Facility: CLINIC | Age: 84
End: 2019-05-10
Payer: MEDICARE

## 2019-05-10 VITALS
TEMPERATURE: 98.4 F | HEART RATE: 67 BPM | DIASTOLIC BLOOD PRESSURE: 85 MMHG | SYSTOLIC BLOOD PRESSURE: 143 MMHG | WEIGHT: 144 LBS | HEIGHT: 59 IN | BODY MASS INDEX: 29.03 KG/M2

## 2019-05-10 DIAGNOSIS — I63.239 CEREBRAL INFARC DUE TO UNSPEC OCCLUSION OR STENOSIS OF UNSPEC CAROTID ARTERY: ICD-10-CM

## 2019-05-10 PROCEDURE — 93882 EXTRACRANIAL UNI/LTD STUDY: CPT

## 2019-05-10 PROCEDURE — 99024 POSTOP FOLLOW-UP VISIT: CPT

## 2019-05-10 RX ORDER — CLOPIDOGREL BISULFATE 75 MG/1
75 TABLET, FILM COATED ORAL DAILY
Qty: 30 | Refills: 2 | Status: ACTIVE | COMMUNITY
Start: 2019-05-10 | End: 1900-01-01

## 2019-05-10 NOTE — DISCUSSION/SUMMARY
[FreeTextEntry1] : s/p left tcar\par duplex: patent stent\par cont dual antiplatelet therapy x 3 months\par fu 3 months for surveillance

## 2019-05-10 NOTE — PHYSICAL EXAM
[Normal Breath Sounds] : Normal breath sounds [Normal Heart Sounds] : normal heart sounds [de-identified] : well healed left neck incision [de-identified] : awake, alert [FreeTextEntry1] : CN 2-12 intact.  no gross motor/sensory deficits

## 2019-05-10 NOTE — REASON FOR VISIT
[de-identified] : left TCAR 4/30/19 [de-identified] : per daughter, pt was dizzy for the first 2 days post op, but that has resolved.  Reports her blood pressure was checked and normal, in the 120s - 150s.  no focal deficits.  The daughter does report her mother still seems a little confused, compared to prior to procedure.

## 2019-08-16 ENCOUNTER — APPOINTMENT (OUTPATIENT)
Dept: VASCULAR SURGERY | Facility: CLINIC | Age: 84
End: 2019-08-16

## 2019-08-23 ENCOUNTER — APPOINTMENT (OUTPATIENT)
Dept: VASCULAR SURGERY | Facility: CLINIC | Age: 84
End: 2019-08-23
Payer: MEDICARE

## 2019-08-23 VITALS
HEART RATE: 74 BPM | HEIGHT: 59 IN | TEMPERATURE: 98.5 F | SYSTOLIC BLOOD PRESSURE: 151 MMHG | DIASTOLIC BLOOD PRESSURE: 80 MMHG | WEIGHT: 158 LBS | BODY MASS INDEX: 31.85 KG/M2

## 2019-08-23 DIAGNOSIS — I65.22 OCCLUSION AND STENOSIS OF LEFT CAROTID ARTERY: ICD-10-CM

## 2019-08-23 PROCEDURE — 93882 EXTRACRANIAL UNI/LTD STUDY: CPT

## 2019-08-23 PROCEDURE — 99213 OFFICE O/P EST LOW 20 MIN: CPT

## 2019-08-23 NOTE — HISTORY OF PRESENT ILLNESS
[FreeTextEntry1] : s/p Left TCAR 4/30/19 [de-identified] : No issues since the last visit.  no temporary weakness, no vision loss.  overall doing well

## 2019-08-23 NOTE — ASSESSMENT
[FreeTextEntry1] : doing well s/p tcar\par ok to dc plavix, continue asa\par fu 6 month for continued surveillance

## 2019-08-23 NOTE — PHYSICAL EXAM
[Normal Breath Sounds] : Normal breath sounds [Normal Heart Sounds] : normal heart sounds [de-identified] : awake, alert [de-identified] : well healed left neck incision [FreeTextEntry1] : CN 2-12 intact.  no gross motor/sensory deficits

## 2020-02-06 NOTE — CONSULT NOTE ADULT - CONSULT REQUESTED BY NAME
laurel Eucrisa Counseling: Patient may experience a mild burning sensation during topical application. Eucrisa is not approved in children less than 2 years of age.

## 2021-11-17 ENCOUNTER — APPOINTMENT (OUTPATIENT)
Dept: ENDOCRINOLOGY | Facility: CLINIC | Age: 86
End: 2021-11-17

## 2022-01-18 ENCOUNTER — APPOINTMENT (OUTPATIENT)
Dept: ENDOCRINOLOGY | Facility: CLINIC | Age: 87
End: 2022-01-18

## 2022-03-15 NOTE — PATIENT PROFILE ADULT - NSTOBACCONEVERSMOKERY/N_GEN_A
Consent (Near Eyelid Margin)/Introductory Paragraph: The rationale for Mohs was explained to the patient and consent was obtained. The risks, benefits and alternatives to therapy were discussed in detail. Specifically, the risks of ectropion or eyelid deformity, infection, scarring, bleeding, prolonged wound healing, incomplete removal, allergy to anesthesia, nerve injury and recurrence were addressed. Prior to the procedure, the treatment site was clearly identified and confirmed by the patient. All components of Universal Protocol/PAUSE Rule completed. No

## 2022-04-06 NOTE — DISCHARGE NOTE NURSING/CASE MANAGEMENT/SOCIAL WORK - NSDCDPATPORTLINK_GEN_ALL_CORE
You can access the [facility] Patient Portal, offered by [facility], by registering with the following website: [email or web address]
[email or web address]

## 2022-06-05 NOTE — STROKE CODE NOTE - DISPOSITION
Shift Summary  Patient did well throughout shift, no significant events to note. Will continue to assess.   Signed by: Sha Hines, Registered Nurse at 5:51 AM on 06/05/2022           Problem: Adult Inpatient Plan of Care  Goal: Plan of Care Review  Outcome: Ongoing, Progressing  Goal: Patient-Specific Goal (Individualized)  Outcome: Ongoing, Progressing  Goal: Absence of Hospital-Acquired Illness or Injury  Outcome: Ongoing, Progressing  Goal: Optimal Comfort and Wellbeing  Outcome: Ongoing, Progressing  Goal: Readiness for Transition of Care  Outcome: Ongoing, Progressing      Stroke Unit

## 2022-09-28 NOTE — H&P PST ADULT - PAIN SCALE PREFERRED, PROFILE
Oxybutynin Counseling:  I discussed with the patient the risks of oxybutynin including but not limited to skin rash, drowsiness, dry mouth, difficulty urinating, and blurred vision. numerical 0-10

## 2023-05-03 NOTE — PATIENT PROFILE ADULT - VISION (WITH CORRECTIVE LENSES IF THE PATIENT USUALLY WEARS THEM):
LM on voicemail for pt to call back  
Partially impaired: cannot see medication labels or newsprint, but can see obstacles in path, and the surrounding layout; can count fingers at arm's length
ADMIT

## 2024-09-06 NOTE — PROCEDURE
Addended by: NIKOLAY CARROLL on: 9/6/2024 11:24 AM     Modules accepted: Orders     [FreeTextEntry1] : duplex: Patent left carotid stent

## 2025-05-20 NOTE — STROKE CODE NOTE - NS AS ED PRESENTATION YN
Health Maintenance   Topic Date Due    Polio vaccine (5 of 5 - 5-dose series) 03/11/2007    Meningococcal B vaccine (1 of 2 - Standard) Never done    Pneumococcal 0-49 years Vaccine (1 of 2 - PCV) 03/11/2022    Pap smear  Never done    Chlamydia/GC screen  07/31/2024    COVID-19 Vaccine (1 - 2024-25 season) Never done    Hepatitis B vaccine (3 of 3 - 3-dose series) 04/08/2026 (Originally 2/16/2004)    Depression Monitoring  04/08/2026    DTaP/Tdap/Td vaccine (8 - Td or Tdap) 10/05/2030    Hepatitis A vaccine  Completed    HPV vaccine  Completed    Flu vaccine  Completed    Hepatitis C screen  Completed    HIV screen  Completed    Hib vaccine  Aged Out    Meningococcal (ACWY) vaccine  Aged Out    Measles,Mumps,Rubella (MMR) vaccine  Discontinued    Varicella vaccine  Discontinued             (applicable per patient's age: Cancer Screenings, Depression Screening, Fall Risk Screening, Immunizations)    Hemoglobin A1C (%)   Date Value   12/15/2021 5.3     AST (U/L)   Date Value   04/08/2025 19     ALT (U/L)   Date Value   04/08/2025 14     BUN (mg/dL)   Date Value   04/08/2025 14      (goal A1C is < 7)   (goal LDL is <100) need 30-50% reduction from baseline     BP Readings from Last 3 Encounters:   04/08/25 120/64   02/24/25 136/88   11/07/24 108/62    (goal /80)      All Future Testing planned in [facility]:  Lab Frequency Next Occurrence   Iron and TIBC Once 10/08/2025   Vitamin D 25 Hydroxy Once 10/08/2025       Next Visit Date:  Future Appointments   Date Time Provider Department Center   10/7/2025 10:20 AM Magalis Deal APRN - CNP [facility] DE[facility]   4/9/2026 11:20 AM Magalis Deal APRN - CNP [facility] DE[facility]            Patient Active Problem List:     Family history of hypercoagulable state     POTS (postural orthostatic tachycardia syndrome)     Dizziness     Postural orthostatic tachycardia syndrome     Major depressive disorder, recurrent episode, mild     Irregular periods     
Yes